# Patient Record
Sex: FEMALE | Race: BLACK OR AFRICAN AMERICAN | Employment: UNEMPLOYED | URBAN - METROPOLITAN AREA
[De-identification: names, ages, dates, MRNs, and addresses within clinical notes are randomized per-mention and may not be internally consistent; named-entity substitution may affect disease eponyms.]

---

## 2019-12-24 ENCOUNTER — HOSPITAL ENCOUNTER (OUTPATIENT)
Dept: CT IMAGING | Age: 34
Discharge: HOME | End: 2019-12-24
Attending: FAMILY MEDICINE
Payer: COMMERCIAL

## 2019-12-24 DIAGNOSIS — S92.254A CLOSED NONDISPLACED FRACTURE OF NAVICULAR BONE OF RIGHT FOOT: ICD-10-CM

## 2019-12-24 PROCEDURE — 73700 CT LOWER EXTREMITY W/O DYE: CPT

## 2020-03-07 ENCOUNTER — HOSPITAL ENCOUNTER (INPATIENT)
Age: 35
LOS: 4 days | Discharge: HOME | DRG: 043 | End: 2020-03-11
Attending: EMERGENCY MEDICINE | Admitting: INTERNAL MEDICINE
Payer: MEDICAID

## 2020-03-07 ENCOUNTER — APPOINTMENT (OUTPATIENT)
Dept: GENERAL RADIOLOGY | Age: 35
DRG: 043 | End: 2020-03-07
Attending: PHYSICIAN ASSISTANT
Payer: MEDICAID

## 2020-03-07 DIAGNOSIS — H46.9 OPTIC NEURITIS: ICD-10-CM

## 2020-03-07 DIAGNOSIS — R42 DIZZINESS: ICD-10-CM

## 2020-03-07 DIAGNOSIS — G35 MULTIPLE SCLEROSIS EXACERBATION (HCC): Primary | ICD-10-CM

## 2020-03-07 DIAGNOSIS — E86.0 DEHYDRATION: ICD-10-CM

## 2020-03-07 DIAGNOSIS — D64.9 ANEMIA, UNSPECIFIED TYPE: ICD-10-CM

## 2020-03-07 DIAGNOSIS — R55 SYNCOPE, UNSPECIFIED SYNCOPE TYPE: ICD-10-CM

## 2020-03-07 DIAGNOSIS — G43.019 INTRACTABLE MIGRAINE WITHOUT AURA AND WITHOUT STATUS MIGRAINOSUS: ICD-10-CM

## 2020-03-07 DIAGNOSIS — E03.8 OTHER SPECIFIED HYPOTHYROIDISM: ICD-10-CM

## 2020-03-07 DIAGNOSIS — E87.6 HYPOKALEMIA: ICD-10-CM
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
## 2020-03-07 PROBLEM — E06.3 HYPOTHYROIDISM DUE TO HASHIMOTO'S THYROIDITIS: Chronic | Status: ACTIVE | Noted: 2020-03-07

## 2020-03-07 PROBLEM — Z72.0 TOBACCO ABUSE DISORDER: Chronic | Status: ACTIVE | Noted: 2020-03-07

## 2020-03-07 PROBLEM — F10.90 CHRONIC ALCOHOL USE: Chronic | Status: ACTIVE | Noted: 2020-03-07
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
## 2020-03-07 LAB
ALBUMIN SERPL-MCNC: 3.6 G/DL (ref 3.5–5)
ALBUMIN/GLOB SERPL: 1.1 {RATIO} (ref 1.1–2.2)
ALP SERPL-CCNC: 82 U/L (ref 45–117)
ALT SERPL-CCNC: 37 U/L (ref 12–78)
AMPHET UR QL SCN: NEGATIVE
ANION GAP SERPL CALC-SCNC: 6 MMOL/L (ref 5–15)
APPEARANCE UR: ABNORMAL
AST SERPL-CCNC: 57 U/L (ref 15–37)
BACTERIA URNS QL MICRO: ABNORMAL /HPF
BARBITURATES UR QL SCN: NEGATIVE
BASOPHILS # BLD: 0 K/UL (ref 0–0.1)
BASOPHILS NFR BLD: 0 % (ref 0–1)
BENZODIAZ UR QL: NEGATIVE
BILIRUB SERPL-MCNC: 0.4 MG/DL (ref 0.2–1)
BILIRUB UR QL: NEGATIVE
BUN SERPL-MCNC: 11 MG/DL (ref 6–20)
BUN/CREAT SERPL: 9 (ref 12–20)
CALCIUM SERPL-MCNC: 8.5 MG/DL (ref 8.5–10.1)
CANNABINOIDS UR QL SCN: NEGATIVE
CHLORIDE SERPL-SCNC: 104 MMOL/L (ref 97–108)
CK SERPL-CCNC: 655 U/L (ref 26–192)
CO2 SERPL-SCNC: 30 MMOL/L (ref 21–32)
COCAINE UR QL SCN: POSITIVE
COLOR UR: ABNORMAL
CREAT SERPL-MCNC: 1.23 MG/DL (ref 0.55–1.02)
DIFFERENTIAL METHOD BLD: ABNORMAL
DRUG SCRN COMMENT,DRGCM: ABNORMAL
EOSINOPHIL # BLD: 0 K/UL (ref 0–0.4)
EOSINOPHIL NFR BLD: 1 % (ref 0–7)
EPITH CASTS URNS QL MICRO: ABNORMAL /LPF
ERYTHROCYTE [DISTWIDTH] IN BLOOD BY AUTOMATED COUNT: 16.5 % (ref 11.5–14.5)
ERYTHROCYTE [SEDIMENTATION RATE] IN BLOOD: 19 MM/HR (ref 0–20)
ERYTHROCYTE [SEDIMENTATION RATE] IN BLOOD: 21 MM/HR (ref 0–20)
ETHANOL SERPL-MCNC: <10 MG/DL
FLUAV AG NPH QL IA: NEGATIVE
FLUBV AG NOSE QL IA: NEGATIVE
GLOBULIN SER CALC-MCNC: 3.4 G/DL (ref 2–4)
GLUCOSE BLD STRIP.AUTO-MCNC: 143 MG/DL (ref 65–100)
GLUCOSE BLD STRIP.AUTO-MCNC: 205 MG/DL (ref 65–100)
GLUCOSE SERPL-MCNC: 91 MG/DL (ref 65–100)
GLUCOSE UR STRIP.AUTO-MCNC: NEGATIVE MG/DL
HCT VFR BLD AUTO: 28.9 % (ref 35–47)
HGB BLD-MCNC: 8.8 G/DL (ref 11.5–16)
HGB UR QL STRIP: NEGATIVE
IMM GRANULOCYTES # BLD AUTO: 0 K/UL (ref 0–0.04)
IMM GRANULOCYTES NFR BLD AUTO: 0 % (ref 0–0.5)
IRON SATN MFR SERPL: 3 % (ref 20–50)
IRON SERPL-MCNC: 16 UG/DL (ref 35–150)
KETONES UR QL STRIP.AUTO: NEGATIVE MG/DL
LEUKOCYTE ESTERASE UR QL STRIP.AUTO: ABNORMAL
LIPASE SERPL-CCNC: 237 U/L (ref 73–393)
LYMPHOCYTES # BLD: 1.6 K/UL (ref 0.8–3.5)
LYMPHOCYTES NFR BLD: 35 % (ref 12–49)
MCH RBC QN AUTO: 25.4 PG (ref 26–34)
MCHC RBC AUTO-ENTMCNC: 30.4 G/DL (ref 30–36.5)
MCV RBC AUTO: 83.5 FL (ref 80–99)
METHADONE UR QL: NEGATIVE
MONOCYTES # BLD: 0.4 K/UL (ref 0–1)
MONOCYTES NFR BLD: 9 % (ref 5–13)
NEUTS SEG # BLD: 2.4 K/UL (ref 1.8–8)
NEUTS SEG NFR BLD: 55 % (ref 32–75)
NITRITE UR QL STRIP.AUTO: POSITIVE
NRBC # BLD: 0 K/UL (ref 0–0.01)
NRBC BLD-RTO: 0 PER 100 WBC
OPIATES UR QL: NEGATIVE
PCP UR QL: NEGATIVE
PH UR STRIP: 6 [PH] (ref 5–8)
PLATELET # BLD AUTO: 249 K/UL (ref 150–400)
PMV BLD AUTO: 9.7 FL (ref 8.9–12.9)
POTASSIUM SERPL-SCNC: 3.3 MMOL/L (ref 3.5–5.1)
PROT SERPL-MCNC: 7 G/DL (ref 6.4–8.2)
PROT UR STRIP-MCNC: ABNORMAL MG/DL
RBC # BLD AUTO: 3.46 M/UL (ref 3.8–5.2)
RBC #/AREA URNS HPF: ABNORMAL /HPF (ref 0–5)
SERVICE CMNT-IMP: ABNORMAL
SERVICE CMNT-IMP: ABNORMAL
SODIUM SERPL-SCNC: 140 MMOL/L (ref 136–145)
SP GR UR REFRACTOMETRY: 1.02 (ref 1–1.03)
T3FREE SERPL-MCNC: <0.5 PG/ML (ref 2.2–4)
T4 FREE SERPL-MCNC: 0.2 NG/DL (ref 0.8–1.5)
TIBC SERPL-MCNC: 468 UG/DL (ref 250–450)
TROPONIN I SERPL-MCNC: <0.05 NG/ML
TSH SERPL DL<=0.05 MIU/L-ACNC: 43.78 UIU/ML (ref 0.36–3.74)
UA: UC IF INDICATED,UAUC: ABNORMAL
UROBILINOGEN UR QL STRIP.AUTO: 1 EU/DL (ref 0.2–1)
WBC # BLD AUTO: 4.5 K/UL (ref 3.6–11)
WBC URNS QL MICRO: ABNORMAL /HPF (ref 0–4)

## 2020-03-07 PROCEDURE — 85652 RBC SED RATE AUTOMATED: CPT

## 2020-03-07 PROCEDURE — 36415 COLL VENOUS BLD VENIPUNCTURE: CPT

## 2020-03-07 PROCEDURE — 74011250636 HC RX REV CODE- 250/636: Performed by: INTERNAL MEDICINE

## 2020-03-07 PROCEDURE — 82962 GLUCOSE BLOOD TEST: CPT

## 2020-03-07 PROCEDURE — 65270000032 HC RM SEMIPRIVATE

## 2020-03-07 PROCEDURE — 82550 ASSAY OF CK (CPK): CPT

## 2020-03-07 PROCEDURE — 99284 EMERGENCY DEPT VISIT MOD MDM: CPT

## 2020-03-07 PROCEDURE — 93005 ELECTROCARDIOGRAM TRACING: CPT

## 2020-03-07 PROCEDURE — 99283 EMERGENCY DEPT VISIT LOW MDM: CPT

## 2020-03-07 PROCEDURE — 87086 URINE CULTURE/COLONY COUNT: CPT

## 2020-03-07 PROCEDURE — 80307 DRUG TEST PRSMV CHEM ANLYZR: CPT

## 2020-03-07 PROCEDURE — 72100 X-RAY EXAM L-S SPINE 2/3 VWS: CPT

## 2020-03-07 PROCEDURE — 74011636637 HC RX REV CODE- 636/637: Performed by: INTERNAL MEDICINE

## 2020-03-07 PROCEDURE — 84484 ASSAY OF TROPONIN QUANT: CPT

## 2020-03-07 PROCEDURE — 80053 COMPREHEN METABOLIC PANEL: CPT

## 2020-03-07 PROCEDURE — 94760 N-INVAS EAR/PLS OXIMETRY 1: CPT

## 2020-03-07 PROCEDURE — 87186 SC STD MICRODIL/AGAR DIL: CPT

## 2020-03-07 PROCEDURE — 96375 TX/PRO/DX INJ NEW DRUG ADDON: CPT

## 2020-03-07 PROCEDURE — 84443 ASSAY THYROID STIM HORMONE: CPT

## 2020-03-07 PROCEDURE — 96374 THER/PROPH/DIAG INJ IV PUSH: CPT

## 2020-03-07 PROCEDURE — 85025 COMPLETE CBC W/AUTO DIFF WBC: CPT

## 2020-03-07 PROCEDURE — 87804 INFLUENZA ASSAY W/OPTIC: CPT

## 2020-03-07 PROCEDURE — 74011000250 HC RX REV CODE- 250: Performed by: INTERNAL MEDICINE

## 2020-03-07 PROCEDURE — 81001 URINALYSIS AUTO W/SCOPE: CPT

## 2020-03-07 PROCEDURE — 83690 ASSAY OF LIPASE: CPT

## 2020-03-07 PROCEDURE — 74011250636 HC RX REV CODE- 250/636: Performed by: PHYSICIAN ASSISTANT

## 2020-03-07 PROCEDURE — 70450 CT HEAD/BRAIN W/O DYE: CPT

## 2020-03-07 PROCEDURE — 83540 ASSAY OF IRON: CPT

## 2020-03-07 PROCEDURE — 87077 CULTURE AEROBIC IDENTIFY: CPT

## 2020-03-07 PROCEDURE — 84481 FREE ASSAY (FT-3): CPT

## 2020-03-07 PROCEDURE — 84439 ASSAY OF FREE THYROXINE: CPT

## 2020-03-07 PROCEDURE — 74011250637 HC RX REV CODE- 250/637: Performed by: PHYSICIAN ASSISTANT

## 2020-03-07 PROCEDURE — 74011000258 HC RX REV CODE- 258: Performed by: PHYSICIAN ASSISTANT

## 2020-03-07 PROCEDURE — 74011250637 HC RX REV CODE- 250/637: Performed by: INTERNAL MEDICINE

## 2020-03-07 RX ORDER — LEVOTHYROXINE SODIUM 50 UG/1
50 TABLET ORAL
Status: DISCONTINUED | OUTPATIENT
Start: 2020-03-07 | End: 2020-03-11 | Stop reason: HOSPADM

## 2020-03-07 RX ORDER — LORAZEPAM 1 MG/1
2 TABLET ORAL
Status: DISCONTINUED | OUTPATIENT
Start: 2020-03-07 | End: 2020-03-11 | Stop reason: HOSPADM

## 2020-03-07 RX ORDER — ACETAMINOPHEN 500 MG
500 TABLET ORAL
Status: DISCONTINUED | OUTPATIENT
Start: 2020-03-07 | End: 2020-03-11 | Stop reason: HOSPADM

## 2020-03-07 RX ORDER — ONDANSETRON 2 MG/ML
2 INJECTION INTRAMUSCULAR; INTRAVENOUS
Status: DISCONTINUED | OUTPATIENT
Start: 2020-03-07 | End: 2020-03-11 | Stop reason: HOSPADM

## 2020-03-07 RX ORDER — POTASSIUM CHLORIDE 750 MG/1
10 TABLET, FILM COATED, EXTENDED RELEASE ORAL
Status: COMPLETED | OUTPATIENT
Start: 2020-03-07 | End: 2020-03-07

## 2020-03-07 RX ORDER — BISACODYL 5 MG
5 TABLET, DELAYED RELEASE (ENTERIC COATED) ORAL DAILY PRN
Status: DISCONTINUED | OUTPATIENT
Start: 2020-03-07 | End: 2020-03-11 | Stop reason: HOSPADM

## 2020-03-07 RX ORDER — SODIUM CHLORIDE 0.9 % (FLUSH) 0.9 %
5-40 SYRINGE (ML) INJECTION AS NEEDED
Status: DISCONTINUED | OUTPATIENT
Start: 2020-03-07 | End: 2020-03-11 | Stop reason: HOSPADM

## 2020-03-07 RX ORDER — INSULIN LISPRO 100 [IU]/ML
INJECTION, SOLUTION INTRAVENOUS; SUBCUTANEOUS
Status: DISCONTINUED | OUTPATIENT
Start: 2020-03-07 | End: 2020-03-11 | Stop reason: HOSPADM

## 2020-03-07 RX ORDER — HEPARIN SODIUM 5000 [USP'U]/ML
5000 INJECTION, SOLUTION INTRAVENOUS; SUBCUTANEOUS EVERY 8 HOURS
Status: DISCONTINUED | OUTPATIENT
Start: 2020-03-07 | End: 2020-03-11 | Stop reason: HOSPADM

## 2020-03-07 RX ORDER — LEVOTHYROXINE SODIUM 50 UG/1
50 TABLET ORAL
COMMUNITY
End: 2020-03-11

## 2020-03-07 RX ORDER — NICOTINE 7MG/24HR
1 PATCH, TRANSDERMAL 24 HOURS TRANSDERMAL
Status: DISCONTINUED | OUTPATIENT
Start: 2020-03-07 | End: 2020-03-11 | Stop reason: HOSPADM

## 2020-03-07 RX ORDER — DEXTROSE MONOHYDRATE 100 MG/ML
0-250 INJECTION, SOLUTION INTRAVENOUS AS NEEDED
Status: DISCONTINUED | OUTPATIENT
Start: 2020-03-07 | End: 2020-03-11 | Stop reason: HOSPADM

## 2020-03-07 RX ORDER — LORAZEPAM 1 MG/1
4 TABLET ORAL
Status: DISCONTINUED | OUTPATIENT
Start: 2020-03-07 | End: 2020-03-11 | Stop reason: HOSPADM

## 2020-03-07 RX ORDER — MAGNESIUM SULFATE 100 %
4 CRYSTALS MISCELLANEOUS AS NEEDED
Status: DISCONTINUED | OUTPATIENT
Start: 2020-03-07 | End: 2020-03-11 | Stop reason: HOSPADM

## 2020-03-07 RX ORDER — IBUPROFEN 400 MG/1
800 TABLET ORAL
Status: ON HOLD | COMMUNITY
Start: 2019-11-26 | End: 2020-03-08 | Stop reason: ALTCHOICE

## 2020-03-07 RX ORDER — HYDROCODONE BITARTRATE AND ACETAMINOPHEN 5; 325 MG/1; MG/1
1 TABLET ORAL
Status: DISCONTINUED | OUTPATIENT
Start: 2020-03-07 | End: 2020-03-07

## 2020-03-07 RX ORDER — TRAMADOL HYDROCHLORIDE 50 MG/1
50 TABLET ORAL
Status: ON HOLD | COMMUNITY
Start: 2019-12-18 | End: 2020-03-08 | Stop reason: ALTCHOICE

## 2020-03-07 RX ORDER — HYDROCODONE BITARTRATE AND ACETAMINOPHEN 5; 325 MG/1; MG/1
1 TABLET ORAL
Status: DISCONTINUED | OUTPATIENT
Start: 2020-03-07 | End: 2020-03-11 | Stop reason: HOSPADM

## 2020-03-07 RX ORDER — ONDANSETRON 2 MG/ML
4 INJECTION INTRAMUSCULAR; INTRAVENOUS
Status: COMPLETED | OUTPATIENT
Start: 2020-03-07 | End: 2020-03-07

## 2020-03-07 RX ORDER — LANOLIN ALCOHOL/MO/W.PET/CERES
100 CREAM (GRAM) TOPICAL DAILY
Status: DISCONTINUED | OUTPATIENT
Start: 2020-03-08 | End: 2020-03-11 | Stop reason: HOSPADM

## 2020-03-07 RX ORDER — CYCLOBENZAPRINE HCL 5 MG
5 TABLET ORAL
Status: ON HOLD | COMMUNITY
End: 2020-03-08 | Stop reason: ALTCHOICE

## 2020-03-07 RX ORDER — FOLIC ACID 1 MG/1
1 TABLET ORAL DAILY
Status: DISCONTINUED | OUTPATIENT
Start: 2020-03-08 | End: 2020-03-11 | Stop reason: HOSPADM

## 2020-03-07 RX ORDER — LORAZEPAM 2 MG/ML
1 INJECTION INTRAMUSCULAR
Status: DISCONTINUED | OUTPATIENT
Start: 2020-03-07 | End: 2020-03-11 | Stop reason: HOSPADM

## 2020-03-07 RX ORDER — LORAZEPAM 2 MG/ML
2 INJECTION INTRAMUSCULAR
Status: DISCONTINUED | OUTPATIENT
Start: 2020-03-07 | End: 2020-03-11 | Stop reason: HOSPADM

## 2020-03-07 RX ORDER — NALOXONE HYDROCHLORIDE 0.4 MG/ML
0.4 INJECTION, SOLUTION INTRAMUSCULAR; INTRAVENOUS; SUBCUTANEOUS AS NEEDED
Status: DISCONTINUED | OUTPATIENT
Start: 2020-03-07 | End: 2020-03-11 | Stop reason: HOSPADM

## 2020-03-07 RX ORDER — MAGNESIUM SULFATE 1 G/100ML
1 INJECTION INTRAVENOUS ONCE
Status: COMPLETED | OUTPATIENT
Start: 2020-03-07 | End: 2020-03-08

## 2020-03-07 RX ORDER — SODIUM CHLORIDE 0.9 % (FLUSH) 0.9 %
5-40 SYRINGE (ML) INJECTION EVERY 8 HOURS
Status: DISCONTINUED | OUTPATIENT
Start: 2020-03-07 | End: 2020-03-11 | Stop reason: HOSPADM

## 2020-03-07 RX ADMIN — THIAMINE HYDROCHLORIDE: 100 INJECTION, SOLUTION INTRAMUSCULAR; INTRAVENOUS at 16:42

## 2020-03-07 RX ADMIN — LEVOTHYROXINE SODIUM 50 MCG: 50 TABLET ORAL at 19:06

## 2020-03-07 RX ADMIN — SODIUM CHLORIDE 1000 MG: 9 INJECTION, SOLUTION INTRAVENOUS at 15:28

## 2020-03-07 RX ADMIN — SODIUM CHLORIDE 1000 ML: 900 INJECTION, SOLUTION INTRAVENOUS at 13:54

## 2020-03-07 RX ADMIN — POTASSIUM CHLORIDE 10 MEQ: 750 TABLET, EXTENDED RELEASE ORAL at 15:28

## 2020-03-07 RX ADMIN — Medication 10 ML: at 21:06

## 2020-03-07 RX ADMIN — ACETAMINOPHEN 500 MG: 500 TABLET ORAL at 16:42

## 2020-03-07 RX ADMIN — ONDANSETRON 4 MG: 2 SOLUTION INTRAMUSCULAR; INTRAVENOUS at 13:54

## 2020-03-07 RX ADMIN — INSULIN LISPRO 2 UNITS: 100 INJECTION, SOLUTION INTRAVENOUS; SUBCUTANEOUS at 19:16

## 2020-03-07 RX ADMIN — HYDROCODONE BITARTRATE AND ACETAMINOPHEN 1 TABLET: 5; 325 TABLET ORAL at 19:05

## 2020-03-07 RX ADMIN — HEPARIN SODIUM 5000 UNITS: 5000 INJECTION INTRAVENOUS; SUBCUTANEOUS at 19:16

## 2020-03-07 NOTE — ED NOTES
TRANSFER - OUT REPORT:    Verbal report given to Pranay Taylor RN (name) on [place]  being transferred to [facility](unit) for routine progression of care       Report consisted of patients Situation, Background, Assessment and   Recommendations(SBAR). Information from the following report(s) SBAR, ED Summary and Recent Results was reviewed with the receiving nurse. Lines:   Peripheral IV 03/07/20 Left Antecubital (Active)   Site Assessment Clean, dry, & intact 3/7/2020  1:46 PM   Phlebitis Assessment 0 3/7/2020  1:46 PM   Infiltration Assessment 0 3/7/2020  1:46 PM   Dressing Status Clean, dry, & intact 3/7/2020  1:46 PM   Dressing Type Transparent 3/7/2020  1:46 PM   Hub Color/Line Status Pink;Patent; Flushed 3/7/2020  1:46 PM   Action Taken Blood drawn 3/7/2020  1:46 PM        Opportunity for questions and clarification was provided.       Patient transported with:   [employer]

## 2020-03-07 NOTE — ED NOTES
Pt presents ambulatory to ED complaining of dizziness and generalized pain and weakness. Pt reports she became dizzy last night and fell down a flight of stairs. Pt reports positive LOC, reports back pain subsequent to fall. Pt is alert and oriented x 4, RR even and unlabored, skin is warm and dry. Assesment completed and pt updated on plan of care. Emergency Department Nursing Plan of Care       The Nursing Plan of Care is developed from the Nursing assessment and Emergency Department Attending provider initial evaluation. The plan of care may be reviewed in the ED Provider note.     The Plan of Care was developed with the following considerations:   Patient / Family readiness to learn indicated by:verbalized understanding  Persons(s) to be included in education: patient  Barriers to Learning/Limitations:No    Signed     Adrian Cee RN    3/7/2020   3:19 PM

## 2020-03-07 NOTE — ED PROVIDER NOTES
EMERGENCY DEPARTMENT HISTORY AND PHYSICAL EXAM      Date: 3/7/2020  Patient Name: [place]    History of Presenting Illness     Chief Complaint   Patient presents with    Fall     History Provided By: Patient    HPI: [place], 29 y.o. female with history of MS, Graves' disease w/ iodine tx and subsequent hypothyroidism, iron deficiency anemia who presents ambulatory to the ED with cc of subacute moderate worsening generalized body aches, dizziness, lightheadedness, blurred vision, headaches, generalized weakness X 2 months. Patient endorses she was seen and evaluated at [facility] ED 1 month prior to arrival diagnosed with UTI. Patient states that she was followed by Dr. Valerio Han in [place], neurology, for MS, but has not been seen by those providers in over a year. States he has not been taking any medications for MS in over a year. States that she has been out of her levothyroxine for 1 month. No medications or alleviating factors for current symptoms. Patient states that she was walking downstairs last night when she all of a sudden blacked out and fell down the stairs. States that she might of hit the right side of her head. Unknown amount of time that she was unconscious. States that she was able to get up and walk back up the stairs afterwards and call her family member. Patient did not seek medical treatment for this last night. The patient endorses worsening right lower extremity weakness and low back pain. Patient endorses that she has a history of right lower extremity weakness due to her MS as well as history of transient paralysis due to MS. Patient versus she has been having decreased appetite and significant weight loss over the last couple of months (typically 170 pounds; today she is 146 pounds).   Denies chest pain, shortness of breath, cough, wheezing, eye pain, double vision, photophobia, chest pain, palpitations, leg swelling or leg pain, incontinence, saddle paresthesias, urinary symptoms, urinary retention, constipation, diarrhea, abdominal pain, confusion, body swelling. PCP: Unknown, Provider    There are no other complaints, changes, or physical findings at this time. No current facility-administered medications on file prior to encounter. Current Outpatient Medications on File Prior to Encounter   Medication Sig Dispense Refill    ibuprofen (MOTRIN) 400 mg tablet       traMADoL (ULTRAM) 50 mg tablet Take 50 mg by mouth. Past History     Past Medical History:  Past Medical History:   Diagnosis Date    Anemia     Graves disease     Multiple sclerosis ([facility] [address]      Past Surgical History:  Past Surgical History:   Procedure Laterality Date    HX  SECTION      x4 occurences    HX FREE SKIN GRAFT       Family History:  History reviewed. No pertinent family history. Social History:  Social History     Tobacco Use    Smoking status: Current Some Day Smoker     Packs/day: 1.00     Years: 15.00     Pack years: 15.00    Smokeless tobacco: Never Used   Substance Use Topics    Alcohol use: Yes     Comment: weekens only    Drug use: Not Currently     Allergies: Allergies   Allergen Reactions    Onion Anaphylaxis    Raspberry Anaphylaxis    Caffeine Other (comments)     Reports \"bad shaking\"     Review of Systems   Review of Systems   Constitutional: Positive for activity change, appetite change, fatigue and unexpected weight change. Negative for chills, diaphoresis and fever. HENT: Negative for congestion, dental problem, ear pain, facial swelling, mouth sores, rhinorrhea, sinus pressure, sore throat, trouble swallowing and voice change. Eyes: Positive for visual disturbance. Negative for photophobia and pain. Respiratory: Negative for apnea, cough, chest tightness, shortness of breath and wheezing. Cardiovascular: Negative for chest pain, palpitations and leg swelling.    Gastrointestinal: Negative for abdominal pain, constipation, diarrhea, nausea and vomiting. Genitourinary: Negative. Negative for decreased urine volume, difficulty urinating, frequency, hematuria, pelvic pain, vaginal bleeding and vaginal discharge. Musculoskeletal: Positive for back pain and myalgias. Negative for gait problem, neck pain and neck stiffness. Skin: Negative. Negative for pallor and wound. Neurological: Positive for dizziness, syncope, weakness, light-headedness and headaches. Negative for seizures, facial asymmetry, speech difficulty and numbness. Psychiatric/Behavioral: Negative. Negative for confusion. Physical Exam   Physical Exam  Vitals signs and nursing note reviewed. Constitutional:       General: She is not in acute distress. Appearance: Normal appearance. She is well-developed. She is not ill-appearing, toxic-appearing or diaphoretic. HENT:      Head: Normocephalic and atraumatic. No raccoon eyes, Fernandez's sign, abrasion, contusion, masses or laceration. Jaw: There is normal jaw occlusion. No trismus. Right Ear: Hearing and external ear normal.      Left Ear: Hearing and external ear normal.      Nose: Nose normal.      Mouth/Throat:      Mouth: Mucous membranes are dry. Pharynx: No oropharyngeal exudate or posterior oropharyngeal erythema. Eyes:      General: No visual field deficit. Conjunctiva/sclera: Conjunctivae normal.      Pupils: Pupils are equal, round, and reactive to light. Neck:      Musculoskeletal: Normal range of motion. Thyroid: No thyroid mass, thyromegaly or thyroid tenderness. Trachea: Trachea and phonation normal.   Cardiovascular:      Rate and Rhythm: Normal rate and regular rhythm. Pulses: Normal pulses. Heart sounds: Normal heart sounds. Pulmonary:      Effort: Pulmonary effort is normal. No respiratory distress. Breath sounds: Normal breath sounds and air entry. No wheezing. Abdominal:      Palpations: Abdomen is soft. Tenderness: There is no abdominal tenderness. There is no guarding. Musculoskeletal: Normal range of motion. Cervical back: Normal.      Thoracic back: Normal.      Lumbar back: She exhibits pain. She exhibits normal range of motion, no tenderness, no bony tenderness, no swelling, no edema, no deformity, no laceration, no spasm and normal pulse. Comments: No crepitus, deformity, step-off, edema, instability, tenderness to palpation. Neurovascular intact. Lymphadenopathy:      Cervical: No cervical adenopathy. Skin:     General: Skin is warm and dry. Coloration: Skin is not pale. Findings: No bruising, ecchymosis, erythema, signs of injury, laceration or wound. Neurological:      Mental Status: She is alert and oriented to person, place, and time. GCS: GCS eye subscore is 4. GCS verbal subscore is 5. GCS motor subscore is 6. Cranial Nerves: Cranial nerves are intact. No cranial nerve deficit, dysarthria or facial asymmetry. Sensory: Sensation is intact. Motor: No seizure activity or pronator drift. Coordination: Coordination is intact. Gait: Gait is intact. Comments: 5/5 BUE. 4/5 RLE. 5/5 LLE. (Pt endorses chronic)   Psychiatric:         Behavior: Behavior normal.         Thought Content:  Thought content normal.         Judgment: Judgment normal.       Diagnostic Study Results   Labs -     Recent Results (from the past 12 hour(s))   CBC WITH AUTOMATED DIFF    Collection Time: 03/07/20  1:13 PM   Result Value Ref Range    WBC 4.5 3.6 - 11.0 K/uL    RBC 3.46 (L) 3.80 - 5.20 M/uL    HGB 8.8 (L) 11.5 - 16.0 g/dL    HCT 28.9 (L) 35.0 - 47.0 %    MCV 83.5 80.0 - 99.0 FL    MCH 25.4 (L) 26.0 - 34.0 PG    MCHC 30.4 30.0 - 36.5 g/dL    RDW 16.5 (H) 11.5 - 14.5 %    PLATELET 929 366 - 116 K/uL    MPV 9.7 8.9 - 12.9 FL    NRBC 0.0 0  WBC    ABSOLUTE NRBC 0.00 0.00 - 0.01 K/uL    NEUTROPHILS 55 32 - 75 %    LYMPHOCYTES 35 12 - 49 %    MONOCYTES 9 5 - 13 %    EOSINOPHILS 1 0 - 7 %    BASOPHILS 0 0 - 1 %    IMMATURE GRANULOCYTES 0 0.0 - 0.5 %    ABS. NEUTROPHILS 2.4 1.8 - 8.0 K/UL    ABS. LYMPHOCYTES 1.6 0.8 - 3.5 K/UL    ABS. MONOCYTES 0.4 0.0 - 1.0 K/UL    ABS. EOSINOPHILS 0.0 0.0 - 0.4 K/UL    ABS. BASOPHILS 0.0 0.0 - 0.1 K/UL    ABS. IMM. GRANS. 0.0 0.00 - 0.04 K/UL    DF AUTOMATED     METABOLIC PANEL, COMPREHENSIVE    Collection Time: 03/07/20  1:13 PM   Result Value Ref Range    Sodium 140 136 - 145 mmol/L    Potassium 3.3 (L) 3.5 - 5.1 mmol/L    Chloride 104 97 - 108 mmol/L    CO2 30 21 - 32 mmol/L    Anion gap 6 5 - 15 mmol/L    Glucose 91 65 - 100 mg/dL    BUN 11 6 - 20 MG/DL    Creatinine 1.23 (H) 0.55 - 1.02 MG/DL    BUN/Creatinine ratio 9 (L) 12 - 20      GFR est AA >60 >60 ml/min/1.73m2    GFR est non-AA 50 (L) >60 ml/min/1.73m2    Calcium 8.5 8.5 - 10.1 MG/DL    Bilirubin, total 0.4 0.2 - 1.0 MG/DL    ALT (SGPT) 37 12 - 78 U/L    AST (SGOT) 57 (H) 15 - 37 U/L    Alk.  phosphatase 82 45 - 117 U/L    Protein, total 7.0 6.4 - 8.2 g/dL    Albumin 3.6 3.5 - 5.0 g/dL    Globulin 3.4 2.0 - 4.0 g/dL    A-G Ratio 1.1 1.1 - 2.2     SED RATE (ESR)    Collection Time: 03/07/20  1:13 PM   Result Value Ref Range    Sed rate, automated 21 (H) 0 - 20 mm/hr   CK    Collection Time: 03/07/20  1:13 PM   Result Value Ref Range     (H) 26 - 192 U/L   INFLUENZA A+B VIRAL AGS    Collection Time: 03/07/20  1:13 PM   Result Value Ref Range    Influenza A Antigen NEGATIVE  NEG      Influenza B Antigen NEGATIVE  NEG     TROPONIN I    Collection Time: 03/07/20  1:13 PM   Result Value Ref Range    Troponin-I, Qt. <0.05 <0.05 ng/mL   ETHYL ALCOHOL    Collection Time: 03/07/20  1:13 PM   Result Value Ref Range    ALCOHOL(ETHYL),SERUM <10 <10 MG/DL   LIPASE    Collection Time: 03/07/20  1:13 PM   Result Value Ref Range    Lipase 237 73 - 393 U/L   TSH 3RD GENERATION    Collection Time: 03/07/20  1:13 PM   Result Value Ref Range    TSH 43.78 (H) 0.36 - 3.74 uIU/mL   EKG, 12 LEAD, INITIAL    Collection Time: 03/07/20  1:22 PM   Result Value Ref Range    Ventricular Rate 51 BPM    Atrial Rate 51 BPM    P-R Interval 226 ms    QRS Duration 84 ms    Q-T Interval 418 ms    QTC Calculation (Bezet) 385 ms    Calculated P Axis 64 degrees    Calculated R Axis 6 degrees    Calculated T Axis 39 degrees    Diagnosis       Sinus bradycardia with 1st degree AV block  Low voltage QRS  Borderline ECG  No previous ECGs available         Radiologic Studies -   CT HEAD WO CONT   Final Result   IMPRESSION:    1. No definite acute intracranial abnormality by this modality. 2. Areas of subcortical white matter hypoattenuation which is consistent with   the clinical history of multiple sclerosis. XR SPINE LUMB 2 OR 3 V   Final Result   IMPRESSION:     1. Normal lumbar spine. Xr Spine Lumb 2 Or 3 V    Result Date: 3/7/2020  IMPRESSION:  1. Normal lumbar spine. Ct Head Wo Cont    Result Date: 3/7/2020  IMPRESSION: 1. No definite acute intracranial abnormality by this modality. 2. Areas of subcortical white matter hypoattenuation which is consistent with the clinical history of multiple sclerosis. Medical Decision Making   I am the first provider for this patient. I reviewed the vital signs, available nursing notes, past medical history, past surgical history, family history and social history. Vital Signs-Reviewed the patient's vital signs. Patient Vitals for the past 12 hrs:   Temp Pulse Resp BP SpO2   03/07/20 1428  (!) 59  111/74    03/07/20 1425  (!) 55  118/80    03/07/20 1420  (!) 53  115/86    03/07/20 1338    125/78    03/07/20 1237 98.1 °F (36.7 °C) 61 18  97 %     Pulse Oximetry Analysis - 97% on RA    EKG interpretation: (Preliminary)  Rhythm: sinus bradycardia w/ 1st degree AV block; and regular . Rate (approx.): 51; Axis: normal; VT interval: prolonged; QRS interval: normal ; ST/T wave: normal; Other findings: borderline ekg.     Records Reviewed: Nursing Notes, Old Medical Records, Previous electrocardiograms, Previous Radiology Studies and Previous Laboratory Studies    Provider Notes (Medical Decision Making):   Pt presents with lightheadedness, dizziness, anorexia. DDx: dehydration, anemia, electrolyte abnormality, infection, orthostatic hypotension, medication side effect, MS. Will get orthostatics, labs and EKG PRN. ED Course:   Initial assessment performed. The patients presenting problems have been discussed, and they are in agreement with the care plan formulated and outlined with them. I have encouraged them to ask questions as they arise throughout their visit. ED Course as of Mar 07 1532   Sat Mar 07, 2020   1434   2:34 PM    I spoke with Dr. Chema Mendiola for Neurology. Discussed available diagnostic tests and clinical findings. He is in agreement with care plans as outlined. He recommends ig IV solumedrol followed by 250mg solumedrol q6. Recommends admission and he will see pt in the morning. Mendoza Gonzalez PA-C    []    Pt resting comfortably in room in NAD. No new symptoms or complaints at this time. Available labs/ results reviewed with pt.    [SM]      ED Course User Index  [SM] Katelin Acosta PA-C       Progress Note:   Updated pt on all returned results and findings. Discussed the importance of proper follow up as referred below along with return precautions. Pt in agreement with the care plan and expresses agreement with and understanding of all items discussed. Disposition:  2:56 PM  Patient is being admitted to the hospital by Dr. Allie Syed. The results of their tests and reasons for their admission have been discussed with them and/or available family. They convey agreement and understanding for the need to be admitted and for their admission diagnosis. Consultation has been made with the inpatient physician specialist for hospitalization. PLAN:  1. Current Discharge Medication List        2. Follow-up Information    None       Return to ED if worse     Diagnosis     Clinical Impression:   1. Multiple sclerosis exacerbation ([address]    2. Anemia, unspecified type    3. Hypokalemia    4. Dehydration    5. Other specified hypothyroidism            Please note that this dictation was completed with Dragon, computer voice recognition software. Quite often unanticipated grammatical, syntax, homophones, and other interpretive errors are inadvertently transcribed by the computer software. Please disregard these errors. Additionally, please excuse any errors that have escaped final proofreading.

## 2020-03-07 NOTE — ED TRIAGE NOTES
Patient presents to ED with c/o dizziness and lower back pain due to fall last night. Hx of Multiple sclerosis.

## 2020-03-07 NOTE — H&P
History and Physical          Pt Name  Mindy Chaves   Date of Birth 1985   Medical Record Number  [id number]      Age  29 y.o. PCP Unknown, Provider   Admit date:  3/7/2020    Room Number  ER03/03  @ [facility]   Date of Service  3/7/2020     Admission Diagnoses:  Multiple sclerosis exacerbation [facility])      Certification: We are admitting Mindy Chaves 29 y.o. female with a principle diagnosis of Multiple sclerosis exacerbation ([address]  This patient also suffers from other comorbidities listed below. I have a high level of concern for progression of  leading to life threatening complications      Assessment and plan:  Present on Admission:   [facility] (multiple sclerosis) [facility]   Multiple sclerosis exacerbation ([facility]   Hypothyroidism due to Hashimoto's thyroiditis   Fall at home   Anemia   Tobacco abuse disorder   Chronic alcohol use      Syncope possibly due to   MS exacerbation -no follow up with neurologist for about 2 years   · Admit to INPT status   · IV Solu-medrol 1gram daily x 3 doses. · SSI for expected hyperglycemia   · PT OT evaluation   · Neurologist consultation   · Fall precautions   · MRI CNS for evaluation of CNS status   · Will need OP follow up. Hypothyroidism -pt has not had thyroid replacement for >2years; she reports that she was taking about 100mcg of syndthroid daily previously   · Resume synthroid at 50mcg/QAM     Chronic use of ETOH -  · CIWA protocol   · M/V IV   · Thiamine and folate PO       Anemia -possibly from menorrhagia and chronic diseases   · Supportive care. Tobacco abuse disorder -<1PPD for \"many years \"  · Nicotine patch 14mg/day PRN       There is no height or weight on file to calculate BMI. -    ·        CODE STATUS  Full         Functional Status  Pt is disabled from her MS   She used to live in [facility], but she moved here with her 10,4,3 yo children.    She now resides with her sister   She acknowledges smoking less than 1ppd for many years   She drinks about half a case of beer per day and occasionally drinks liquor    Surrogate decision maker: Pt's sister Ms. Doni Bowen   [phone]   Prophylaxis  Hep SQ   Discharge Plan: Home w/Family,     There are currently no Active Isolations Payor: [facility] MEDICAID / Plan: [address] / Product Type: Managed Care Medicaid /    Social issues  Date Comment    20 3:37 PM No family or visitor here with the patient today         Prognosis  Fair      Subjective Data         History of Present Illness : The pt reports that she has had diagnosis of MS for long time and she had been followed by [facility] neurologists. Due to family reasons she came to [facility] for over two years. sonce then she has not had any follow up with neurologist or PCP   She has not taken any MS or thyroid medications.       Review of Systems - History obtained from the patient  General ROS: negative for - chills, fatigue or fever  Psychological ROS: negative  Ophthalmic ROS: negative  Respiratory ROS: no cough, shortness of breath, or wheezing  Cardiovascular ROS: no chest pain or dyspnea on exertion  Gastrointestinal ROS: no abdominal pain, change in bowel habits, or black or bloody stools  Genito-Urinary ROS: no dysuria, trouble voiding, or hematuria  Musculoskeletal ROS: positive for - gait disturbance and muscular weakness  negative for - joint pain, joint stiffness or joint swelling  Neurological ROS: positive for - dizziness and weakness  Dermatological ROS: negative  ROS       Past Medical History:   Diagnosis Date    Anemia     Graves disease     Multiple sclerosis ([address]          Past Surgical History:   Procedure Laterality Date    HX  SECTION      x4 occurences    HX FREE SKIN GRAFT           Social History     Tobacco Use    Smoking status: Current Some Day Smoker     Packs/day: 1.00     Years: 15.00     Pack years: 15.00    Smokeless tobacco: Never Used Substance Use Topics    Alcohol use: Yes     Comment: weekens only       History reviewed. No pertinent family history. No one in the family is known to suffer from [address]        Objective data     Comment:  pleasant cooperaive lady lying in ER bed in no distress      Patient Vitals for the past 24 hrs:   Temp   03/07/20 1237 98.1 °F (36.7 °C)      Patient Vitals for the past 24 hrs:   Pulse   03/07/20 1428 (!) 59   03/07/20 1425 (!) 55   03/07/20 1420 (!) 53   03/07/20 1237 61      Patient Vitals for the past 24 hrs:   Resp   03/07/20 1237 18      Patient Vitals for the past 24 hrs:   BP   03/07/20 1428 111/74   03/07/20 1425 118/80   03/07/20 1420 115/86   03/07/20 1338 125/78        SpO2 Readings from Last 6 Encounters:   03/07/20 97%         O2 Device: Room air   There is no height or weight on file to calculate BMI. - Wt Readings from Last 10 Encounters:   03/07/20 66.2 kg (146 lb)          Physical Exam:             General:  Alert, cooperative,   well noursished,   well developed,   appears stated age    Ears/Eyes:  Hearing intact  Sclera anicteric. Pupils equal   Mouth/Throat:  Mucous membranes normal pink and moist  Oral pharynx clear    Neck:     Lungs:  Trachea midline  Chest excursion symmetrical   Auscultation B/L Symmetrical with   Vesicular breath sounds     No Crepitations noted   Percussion note resonant on mid Clavicular line; no sign of pneumothorax        CVS:  Regular rate and rhythm   no  murmur,   Bradycardia note   No click, rub or gallop  S1 normal   S2 normal   Pedal pulses  b/l symmetrical   Abdomen:  Obese  Soft, non-tender  Bowel sounds normal  No distension   Percussion note tympanitic   Extremities:    No cyanosis, jaundice  No edema noted   No sign of DVT/cord like lesion on palpation  No sign of acute trauma   Age appropriate OA changes noted.     Skin:    Skin color, texture, turgor normal. no acute rash or lesions    Lymph nodes:     Musculoskeletal Muscle bulk B/L symmetrical Neuro Cranial nerves are intact,   motor movement b/l symmetrical   Sensory evaluation decreased sensation on the left LE with decreased touch sensation    Psych:  Alert and oriented, normal mood & affect given the clinical scenario          Medications reviewed     Current Facility-Administered Medications   Medication Dose Route Frequency    potassium chloride SR (KLOR-CON 10) tablet 10 mEq  10 mEq Oral NOW    methylPREDNISolone ((Solu-MEDROL) 1,000 mg in 0.9% sodium chloride 100 mL IVPB  1,000 mg IntraVENous NOW     Current Outpatient Medications   Medication Sig    ibuprofen (MOTRIN) 400 mg tablet     traMADoL (ULTRAM) 50 mg tablet Take 50 mg by mouth. Relevant other informations: Other medical conditions listed in this following active hospital problem list section; all of these and other pertinent data were taken into consideration when treatment plan is developed and customized to this patient's unique overall circumstances and needs. We have reviewed available old medical records within the constraints of this admission process. Present on Admission:   [facility] ([facility] sclerosis) [facility]   Multiple sclerosis exacerbation ([facility]   Hypothyroidism due to Hashimoto's thyroiditis   Fall at home   Anemia       Data Review:   Recent Days:  All Micro Results     None          Recent Labs     03/07/20  1313   WBC 4.5   HGB 8.8*   HCT 28.9*        Recent Labs     03/07/20  1313      K 3.3*      CO2 30   GLU 91   BUN 11   CREA 1.23*   CA 8.5   ALB 3.6   TBILI 0.4   SGOT 57*   ALT 37      Lab Results   Component Value Date/Time    TSH 43.78 (H) 03/07/2020 01:13 PM         Care Plan discussed with: Patient/Family and Nurse   Other medical conditions are listed in the active hospital problem list section; these and other pertinent data were taken into consideration when the treatment plan was developed and customized to this patient's unique overall circumstances and needs.     Hig complexity decision making was performed for this patient who is at high risk for decompensation with multiple organ involvement. Today total floor/unit time was 65  minutes while caring for this patient and greater than 50% of that time was spent with patient (and/or family) coordinating patients clinical issues.      Bethany Ortiz MD MPH  FACP                               3/7/2020       __________________________________________________________   FOR SOUND PHYSICIAN ADMINISTRATIVE USE ONLY   MDM       INPT 223      Tyshawn Uribe MD MPH FACP   3:28 PM  3/7/2020

## 2020-03-07 NOTE — PROGRESS NOTES
.. TRANSFER - IN REPORT:    Verbal report received from [place], RN (name) on [place]  being received from ED (unit) for routine progression of care      Report consisted of patients Situation, Background, Assessment and   Recommendations(SBAR). Information from the following report(s) SBAR, Kardex, MAR, Accordion and Recent Results was reviewed with the receiving nurse. Opportunity for questions and clarification was provided. Assessment completed upon patients arrival to unit and care assumed.

## 2020-03-07 NOTE — PROGRESS NOTES
1835) pt stated pain 8/10 back and spine. Sore tooth--difficult to chew. Vision slightly blurry but not as bad as past exacerbation. Pt stated broke up with boyfriend 3 months ago--started smoking again. Pt stated in the past that Prozac has helped with depression and smoking in the past.   1850) Discuss with Dr. Allie Syed, able to given [place] for severe pain. 1920) Bedside shift change report given to Marty Suresh RN (oncoming nurse) by Bryan Mas RN (offgoing nurse). Report included the following information SBAR, Kardex, MAR, Accordion and Recent Results.

## 2020-03-08 PROBLEM — F14.90 COCAINE USE: Status: ACTIVE | Noted: 2020-03-08

## 2020-03-08 LAB
25(OH)D3 SERPL-MCNC: 13.7 NG/ML (ref 30–100)
AMMONIA PLAS-SCNC: 10 UMOL/L
ANION GAP SERPL CALC-SCNC: 10 MMOL/L (ref 5–15)
BASOPHILS # BLD: 0 K/UL (ref 0–0.1)
BASOPHILS NFR BLD: 0 % (ref 0–1)
BUN SERPL-MCNC: 12 MG/DL (ref 6–20)
BUN/CREAT SERPL: 11 (ref 12–20)
CALCIUM SERPL-MCNC: 8.3 MG/DL (ref 8.5–10.1)
CHLORIDE SERPL-SCNC: 104 MMOL/L (ref 97–108)
CK SERPL-CCNC: 462 U/L (ref 26–192)
CO2 SERPL-SCNC: 25 MMOL/L (ref 21–32)
CREAT SERPL-MCNC: 1.08 MG/DL (ref 0.55–1.02)
DIFFERENTIAL METHOD BLD: ABNORMAL
EOSINOPHIL # BLD: 0 K/UL (ref 0–0.4)
EOSINOPHIL NFR BLD: 0 % (ref 0–7)
ERYTHROCYTE [DISTWIDTH] IN BLOOD BY AUTOMATED COUNT: 16.4 % (ref 11.5–14.5)
ERYTHROCYTE [SEDIMENTATION RATE] IN BLOOD: 20 MM/HR (ref 0–20)
GLUCOSE BLD STRIP.AUTO-MCNC: 114 MG/DL (ref 65–100)
GLUCOSE BLD STRIP.AUTO-MCNC: 114 MG/DL (ref 65–100)
GLUCOSE BLD STRIP.AUTO-MCNC: 155 MG/DL (ref 65–100)
GLUCOSE BLD STRIP.AUTO-MCNC: 199 MG/DL (ref 65–100)
GLUCOSE SERPL-MCNC: 161 MG/DL (ref 65–100)
HCT VFR BLD AUTO: 26 % (ref 35–47)
HGB BLD-MCNC: 8.1 G/DL (ref 11.5–16)
IMM GRANULOCYTES # BLD AUTO: 0 K/UL (ref 0–0.04)
IMM GRANULOCYTES NFR BLD AUTO: 1 % (ref 0–0.5)
LIPASE SERPL-CCNC: 219 U/L (ref 73–393)
LYMPHOCYTES # BLD: 0.8 K/UL (ref 0.8–3.5)
LYMPHOCYTES NFR BLD: 21 % (ref 12–49)
MAGNESIUM SERPL-MCNC: 2.4 MG/DL (ref 1.6–2.4)
MCH RBC QN AUTO: 25.6 PG (ref 26–34)
MCHC RBC AUTO-ENTMCNC: 31.2 G/DL (ref 30–36.5)
MCV RBC AUTO: 82 FL (ref 80–99)
MONOCYTES # BLD: 0 K/UL (ref 0–1)
MONOCYTES NFR BLD: 1 % (ref 5–13)
NEUTS SEG # BLD: 2.9 K/UL (ref 1.8–8)
NEUTS SEG NFR BLD: 77 % (ref 32–75)
NRBC # BLD: 0 K/UL (ref 0–0.01)
NRBC BLD-RTO: 0 PER 100 WBC
PLATELET # BLD AUTO: 235 K/UL (ref 150–400)
PMV BLD AUTO: 9.9 FL (ref 8.9–12.9)
POTASSIUM SERPL-SCNC: 3.6 MMOL/L (ref 3.5–5.1)
RBC # BLD AUTO: 3.17 M/UL (ref 3.8–5.2)
RBC MORPH BLD: ABNORMAL
RBC MORPH BLD: ABNORMAL
SERVICE CMNT-IMP: ABNORMAL
SODIUM SERPL-SCNC: 139 MMOL/L (ref 136–145)
VIT B12 SERPL-MCNC: 420 PG/ML (ref 193–986)
WBC # BLD AUTO: 3.7 K/UL (ref 3.6–11)

## 2020-03-08 PROCEDURE — 82607 VITAMIN B-12: CPT

## 2020-03-08 PROCEDURE — 97116 GAIT TRAINING THERAPY: CPT | Performed by: PHYSICAL THERAPIST

## 2020-03-08 PROCEDURE — 74011636637 HC RX REV CODE- 636/637: Performed by: INTERNAL MEDICINE

## 2020-03-08 PROCEDURE — 36415 COLL VENOUS BLD VENIPUNCTURE: CPT

## 2020-03-08 PROCEDURE — 82962 GLUCOSE BLOOD TEST: CPT

## 2020-03-08 PROCEDURE — 84155 ASSAY OF PROTEIN SERUM: CPT

## 2020-03-08 PROCEDURE — 97161 PT EVAL LOW COMPLEX 20 MIN: CPT | Performed by: PHYSICAL THERAPIST

## 2020-03-08 PROCEDURE — 85025 COMPLETE CBC W/AUTO DIFF WBC: CPT

## 2020-03-08 PROCEDURE — 82550 ASSAY OF CK (CPK): CPT

## 2020-03-08 PROCEDURE — 74011250637 HC RX REV CODE- 250/637: Performed by: INTERNAL MEDICINE

## 2020-03-08 PROCEDURE — 94760 N-INVAS EAR/PLS OXIMETRY 1: CPT

## 2020-03-08 PROCEDURE — 83690 ASSAY OF LIPASE: CPT

## 2020-03-08 PROCEDURE — 82140 ASSAY OF AMMONIA: CPT

## 2020-03-08 PROCEDURE — 85652 RBC SED RATE AUTOMATED: CPT

## 2020-03-08 PROCEDURE — 82306 VITAMIN D 25 HYDROXY: CPT

## 2020-03-08 PROCEDURE — 74011250636 HC RX REV CODE- 250/636: Performed by: PSYCHIATRY & NEUROLOGY

## 2020-03-08 PROCEDURE — 74011000258 HC RX REV CODE- 258: Performed by: PSYCHIATRY & NEUROLOGY

## 2020-03-08 PROCEDURE — 83735 ASSAY OF MAGNESIUM: CPT

## 2020-03-08 PROCEDURE — 65270000032 HC RM SEMIPRIVATE

## 2020-03-08 PROCEDURE — 74011250637 HC RX REV CODE- 250/637: Performed by: PSYCHIATRY & NEUROLOGY

## 2020-03-08 PROCEDURE — 80048 BASIC METABOLIC PNL TOTAL CA: CPT

## 2020-03-08 PROCEDURE — 74011250636 HC RX REV CODE- 250/636: Performed by: INTERNAL MEDICINE

## 2020-03-08 RX ORDER — TOPIRAMATE 25 MG/1
25 TABLET ORAL
Status: DISCONTINUED | OUTPATIENT
Start: 2020-03-08 | End: 2020-03-11 | Stop reason: HOSPADM

## 2020-03-08 RX ORDER — SUMATRIPTAN 25 MG/1
100 TABLET, FILM COATED ORAL AS NEEDED
Status: DISCONTINUED | OUTPATIENT
Start: 2020-03-08 | End: 2020-03-11 | Stop reason: HOSPADM

## 2020-03-08 RX ORDER — TIZANIDINE 4 MG/1
4 TABLET ORAL
Status: DISCONTINUED | OUTPATIENT
Start: 2020-03-08 | End: 2020-03-09

## 2020-03-08 RX ADMIN — Medication 10 ML: at 03:44

## 2020-03-08 RX ADMIN — Medication 10 ML: at 08:04

## 2020-03-08 RX ADMIN — ACETAMINOPHEN 500 MG: 500 TABLET ORAL at 22:15

## 2020-03-08 RX ADMIN — HYDROCODONE BITARTRATE AND ACETAMINOPHEN 1 TABLET: 5; 325 TABLET ORAL at 08:49

## 2020-03-08 RX ADMIN — HEPARIN SODIUM 5000 UNITS: 5000 INJECTION INTRAVENOUS; SUBCUTANEOUS at 10:48

## 2020-03-08 RX ADMIN — SODIUM CHLORIDE 1000 MG: 9 INJECTION, SOLUTION INTRAVENOUS at 15:46

## 2020-03-08 RX ADMIN — INSULIN LISPRO 2 UNITS: 100 INJECTION, SOLUTION INTRAVENOUS; SUBCUTANEOUS at 08:49

## 2020-03-08 RX ADMIN — Medication 100 MG: at 08:49

## 2020-03-08 RX ADMIN — HYDROCODONE BITARTRATE AND ACETAMINOPHEN 1 TABLET: 5; 325 TABLET ORAL at 15:47

## 2020-03-08 RX ADMIN — MAGNESIUM SULFATE HEPTAHYDRATE 1 G: 1 INJECTION, SOLUTION INTRAVENOUS at 03:37

## 2020-03-08 RX ADMIN — FOLIC ACID 1 MG: 1 TABLET ORAL at 08:49

## 2020-03-08 RX ADMIN — Medication 10 ML: at 14:31

## 2020-03-08 RX ADMIN — LEVOTHYROXINE SODIUM 50 MCG: 50 TABLET ORAL at 08:04

## 2020-03-08 RX ADMIN — TOPIRAMATE 25 MG: 25 TABLET ORAL at 22:15

## 2020-03-08 RX ADMIN — Medication 10 ML: at 22:16

## 2020-03-08 RX ADMIN — HYDROCODONE BITARTRATE AND ACETAMINOPHEN 1 TABLET: 5; 325 TABLET ORAL at 01:55

## 2020-03-08 RX ADMIN — TIZANIDINE 4 MG: 4 TABLET ORAL at 22:15

## 2020-03-08 NOTE — PROGRESS NOTES
Progress Note      Pt Name  Juany Pablo   Date of Birth 1985   Medical Record Number  [id number]      Age  29 y.o. PCP Unknown, Provider   Admit date:  3/7/2020    Room Number  213/01  @ [facility]   Date of Service  3/8/2020     Admission Diagnoses:  Multiple sclerosis exacerbation [facility])     Admission Summary:  \" The pt reports that she has had diagnosis of MS for long time and she had been followed by [facility] neurologists. Due to family reasons she came to [place] for over two years. sonce then she has not had any follow up with neurologist or PCP   She has not taken any MS or thyroid medications. \"     Interval history/progression:   03/08/20 - pt was evaluated by neurologist     Assessment and plan:     Present on Admission:   MS (multiple sclerosis) [facility]   Multiple sclerosis exacerbation ([facility]   Hypothyroidism due to Hashimoto's thyroiditis   Fall at home   Anemia   Tobacco abuse disorder   Chronic alcohol use        Syncope possibly due to   MS exacerbation -no follow up with neurologist for about 2 years   · Admit to INPT status   · IV Solu-medrol 1gram daily x 3 doses.    · SSI for expected hyperglycemia   · PT OT evaluation   · Neurologist consultation   · Fall precautions   · MRI CNS for evaluation of CNS status   · Will need OP follow up.      Hypothyroidism -pt has not had thyroid replacement for >2years; she reports that she was taking about 100mcg of syndthroid daily previously   · Resume synthroid at 50mcg/QAM      Chronic use of ETOH -  · CIWA protocol   · M/V IV   · Thiamine and folate PO         Anemia -possibly from menorrhagia and chronic diseases   · Supportive care.      Tobacco abuse disorder -<1PPD for \"many years \"  · Nicotine patch 14mg/day PRN     Cocaine abuse      Body mass index is 28.81 kg/m².         CODE STATUS  Full           Functional Status  Pt is disabled from her MS   She used to live in [facility], but she moved here with her 10,4,3 yo children. She now resides with her sister   She acknowledges smoking less than 1ppd for many years   She drinks about half a case of beer per day and occasionally drinks liquor    Surrogate decision maker: Pt's sister Ms. Bernice Khan   [phone]   Prophylaxis  Hep SQ   Discharge Plan: Home w/Family,     There are currently no Active Isolations Payor: [facility] MEDICAID / Plan: [address]  / Product Type: Managed Care Medicaid /    Social issues  Date Comment    03/07/20 3:37 PM No family or visitor here with the patient today      03/0820 11:55 AM No family or visitor here with the patient today             Prognosis  Fair           Subjective Data     \"OK; tired \"  Review of Systems - History obtained from the patient  Respiratory ROS: no cough, shortness of breath, or wheezing  Cardiovascular ROS: no chest pain or dyspnea on exertion    Objective Data       Comments  Pleasant cooperative; in no distress      Patient Vitals for the past 24 hrs:   BP   03/08/20 0805 120/86   03/07/20 2058 100/66   03/07/20 1827 101/65   03/07/20 1600 122/78   03/07/20 1428 111/74   03/07/20 1425 118/80   03/07/20 1420 115/86   03/07/20 1338 125/78      Patient Vitals for the past 24 hrs:   Pulse   03/08/20 0805 62   03/07/20 2058 60   03/07/20 1827 (!) 59   03/07/20 1428 (!) 59   03/07/20 1425 (!) 55   03/07/20 1420 (!) 53   03/07/20 1237 61      Patient Vitals for the past 24 hrs:   Resp   03/08/20 0805 16   03/07/20 2058 16   03/07/20 1827 16   03/07/20 1237 18      Patient Vitals for the past 24 hrs:   Temp   03/08/20 0805 98.2 °F (36.8 °C)   03/07/20 2058 97.3 °F (36.3 °C)   03/07/20 1827 97.3 °F (36.3 °C)   03/07/20 1237 98.1 °F (36.7 °C)        SpO2 Readings from Last 6 Encounters:   03/08/20 100%          O2 Device: Room air Body mass index is 28.81 kg/m².  -  Wt Readings from Last 10 Encounters:   03/07/20 68 kg (150 lb)        Physical Exam:             General:  Alert, cooperative,   well noursished,   well developed,   appears stated age    Ears/Eyes:  Hearing intact  Sclera anicteric. Pupils equal   Mouth/Throat:     Neck:     Lungs:  Trachea midline  Chest excursion symmetrical   Auscultation B/L Symmetrical with   Vesicular breath sounds     No Crepitations noted          CVS:  Regular rate and rhythm   no  murmur,   No click, rub or gallop  S1 normal   S2 normal   Pedal pulses  b/l symmetrical   Abdomen:  Obese  Soft, non-tender  Bowel sounds normal     Extremities:    No cyanosis, jaundice  No edema noted   No sign of DVT/cord like lesion on palpation  No sign of acute trauma .     Skin:    Skin color, texture, turgor normal. no acute rash or lesions    Lymph nodes:     Musculoskeletal Muscle bulk B/L symmetrical   Neuro Cranial nerves are intact,   motor movement b/l symmetrical,   Pt continues to have slight hypoasthesia of the LLE    Psych:  Alert and oriented,   normal mood & affect          Medications reviewed     Current Facility-Administered Medications   Medication Dose Route Frequency    tiZANidine (ZANAFLEX) tablet 4 mg  4 mg Oral QHS    SUMAtriptan (IMITREX) tablet 100 mg  100 mg Oral PRN    topiramate (TOPAMAX) tablet 25 mg  25 mg Oral QHS    sodium chloride (NS) flush 5-40 mL  5-40 mL IntraVENous Q8H    sodium chloride (NS) flush 5-40 mL  5-40 mL IntraVENous PRN    acetaminophen (TYLENOL) tablet 500 mg  500 mg Oral Q4H PRN    naloxone (NARCAN) injection 0.4 mg  0.4 mg IntraVENous PRN    ondansetron (ZOFRAN) injection 2 mg  2 mg IntraVENous Q6H PRN    bisacodyL (DULCOLAX) tablet 5 mg  5 mg Oral DAILY PRN    nicotine (NICODERM CQ) 7 mg/24 hr patch 1 Patch  1 Patch TransDERmal DAILY PRN    heparin (porcine) injection 5,000 Units  5,000 Units SubCUTAneous Q8H    levothyroxine (SYNTHROID) tablet 50 mcg  50 mcg Oral ACB    insulin lispro (HUMALOG) injection   SubCUTAneous AC&HS    glucose chewable tablet 16 g  4 Tab Oral PRN    glucagon (GLUCAGEN) injection 1 mg  1 mg IntraMUSCular PRN    dextrose 10% infusion 0-250 mL  0-250 mL IntraVENous PRN    LORazepam (ATIVAN) tablet 2 mg  2 mg Oral Q1H PRN    LORazepam (ATIVAN) tablet 4 mg  4 mg Oral Q1H PRN    LORazepam (ATIVAN) injection 1 mg  1 mg IntraVENous Q1H PRN    LORazepam (ATIVAN) injection 2 mg  2 mg IntraVENous K9R PRN    folic acid (FOLVITE) tablet 1 mg  1 mg Oral DAILY    thiamine HCL (B-1) tablet 100 mg  100 mg Oral DAILY    methylPREDNISolone ((Solu-MEDROL) 1,000 mg in 0.9% sodium chloride 100 mL IVPB  1,000 mg IntraVENous DAILY    HYDROcodone-acetaminophen (NORCO) 5-325 mg per tablet 1 Tab  1 Tab Oral Q6H PRN       Relevant other informations: Other medical conditions listed in [facility] hospital problem list section; all of these and other pertinent data were taken into consideration when treatment plan is developed and customized to this patient's unique overall circumstances and needs. We have reviewed available old medical records within the constraints of this admission process.         Data Review:   Recent Days:  All Micro Results     Procedure Component Value Units Date/Time    CULTURE, URINE [[id number]] Collected:  03/07/20 2210    Order Status:  Completed Updated:  03/08/20 1107          Recent Labs     03/08/20  0445 03/07/20  1313   WBC 3.7 4.5   HGB 8.1* 8.8*   HCT 26.0* 28.9*    249     Recent Labs     03/08/20  0445 03/07/20  1313    140   K 3.6 3.3*    104   CO2 25 30   * 91   BUN 12 11   CREA 1.08* 1.23*   CA 8.3* 8.5   MG 2.4  --    ALB  --  3.6   TBILI  --  0.4   SGOT  --  57*   ALT  --  37      Lab Results   Component Value Date/Time    TSH 43.78 (H) 03/07/2020 01:13 PM            Care Plan discussed with:Patient/Family and Nurse   Other medical conditions are listed in the active hospital problem list section; these and other pertinent data were taken into consideration when the treatment plan was developed and customized to this patient's unique overall circumstances and needs. High complexity decision making was performed for this patient who is at high risk for decompensation with multiple organ involvement. Today total floor/unit time was 35 minutes while caring for this patient and greater than 50% of that time was spent with patient (and/or family) coordinating patients clinical issues; this includes time spent during multidisciplinary rounds.         Berry Bernard MD MPH FACP    3/8/2020

## 2020-03-08 NOTE — PROGRESS NOTES
Problem: Mobility Impaired (Adult and Pediatric)  Goal: *Acute Goals and Plan of Care (Insert Text)  Description  FUNCTIONAL STATUS PRIOR TO ADMISSION: Patient was independent and active without use of DME.    HOME SUPPORT PRIOR TO ADMISSION: The patient lived with her 3 children but did not require assist. Her sister and niece will be moving in with her. Physical Therapy Goals  Initiated 3/8/2020  1. Patient will transfer from bed to chair and chair to bed with independence using the least restrictive device within 7 day(s). 2.  Patient will perform sit to stand with independence within 7 day(s). 3.  Patient will ambulate with supervision/set-up for 200 feet with the least restrictive device within 7 day(s). Outcome: Progressing Towards Goal     PHYSICAL THERAPY EVALUATION  Patient: Su Ramirez (29 y.o. female)  Date: 3/8/2020  Primary Diagnosis: MS (multiple sclerosis) ([facility] [G35]        Precautions:          ASSESSMENT  Based on the objective data described below, the patient presents with decreased balance, RLE strength, activity tolerance, and overall functional mobility. PMH of Multiple Sclerosis. Patient modified independent with bed mobility and transfers. Patient ambulated 150 feet with contact guard assistance. Two standing rest breaks provided. Patient had one loss of balance to the right that required contact guard assist to recover. Patient reports that her sister will be moving in to help. Recommend home health PT at discharge. Patient will benefit from skilled therapy intervention to address the above noted impairments. PLAN :  Recommendations and Planned Interventions: bed mobility training, transfer training, gait training, therapeutic exercises, neuromuscular re-education, patient and family training/education, and therapeutic activities      Frequency/Duration: Patient will be followed by physical therapy:  2-3 times a week to address goals.     Recommendation for discharge: (in order for the patient to meet his/her long term goals)  Physical therapy at least 2 days/week in the home AND ensure assist and/or supervision for safety with balance     IF patient discharges home will need the following DME: none         SUBJECTIVE:   Patient stated I feel okay.     OBJECTIVE DATA SUMMARY:   HISTORY:    Past Medical History:   Diagnosis Date    Anemia     Graves disease     Multiple sclerosis ([address]      Past Surgical History:   Procedure Laterality Date    HX  SECTION      x4 occurences    HX FREE SKIN GRAFT         Home Situation  Home Environment: Apartment  One/Two Story Residence: Two story  Living Alone: No(4 kids)  Support Systems: Family member(s)(sister)  Patient Expects to be Discharged to[de-identified] Apartment  Current DME Used/Available at Home: margarita Krishna(in [place] with mom)    EXAMINATION/PRESENTATION/DECISION MAKING:   Critical Behavior:   Alert and oriented x4    Hearing: Auditory  Auditory Impairment: None       Strength:     Generally decreased; functional  RLE weaker than LLE     Functional Mobility:  Bed Mobility:     Supine to Sit: Modified independent  Sit to Supine: Modified independent  Scooting: Modified independent    Transfers:  Sit to Stand: Modified independent  Stand to Sit: Modified independent       Balance:   Sitting: Intact  Standing: Impaired; Without support  Standing - Static: Good  Standing - Dynamic : Fair    Ambulation/Gait Training:  Distance (ft): 150 Feet (ft)  Assistive Device: Gait belt  Ambulation - Level of Assistance: Contact guard assistance  Gait Description (WDL): Exceptions to WDL  Gait Abnormalities: Decreased step clearance  Base of Support: Widened  Speed/Erinn: Pace decreased (<100 feet/min)  Step Length: Right shortened;Left shortened    Based on the above components, the patient evaluation is determined to be of the following complexity level: LOW     Pain Rating:  Right side of jaw, 2/10    Activity Tolerance: Fair  Please refer to the flowsheet for vital signs taken during this treatment. After treatment patient left in no apparent distress:   Supine in bed and Call bell within reach    COMMUNICATION/EDUCATION:   The patients plan of care was discussed with: Registered nurse. Fall prevention education was provided and the patient/caregiver indicated understanding., Patient/family have participated as able in goal setting and plan of care. , and Patient/family agree to work toward stated goals and plan of care.     Thank you for this referral.  Joel Deleon, PT   Time Calculation: 15 mins

## 2020-03-08 NOTE — PROGRESS NOTES
Care Plan Reviewed     Problem: Falls - Risk of  Goal: *Absence of Falls  Description  Document Serg Akbar Fall Risk and appropriate interventions in the flowsheet.   Outcome: Progressing Towards Goal  Note: Fall Risk Interventions:            Medication Interventions: Patient to call before getting OOB         History of Falls Interventions: Door open when patient unattended         Problem: Patient Education: Go to Patient Education Activity  Goal: Patient/Family Education  Outcome: Progressing Towards Goal     Problem: Pain  Goal: *Control of Pain  Outcome: Progressing Towards Goal     Problem: Patient Education: Go to Patient Education Activity  Goal: Patient/Family Education  Outcome: Progressing Towards Goal     Problem: Breathing Pattern - Ineffective  Goal: *Absence of hypoxia  Outcome: Progressing Towards Goal     Problem: Patient Education: Go to Patient Education Activity  Goal: Patient/Family Education  Outcome: Progressing Towards Goal

## 2020-03-08 NOTE — PROGRESS NOTES
[facility] Admission Pharmacy Medication Reconciliation     Findings:     Use list below for information only. Patient should be on these medications but has been taking them as directed. Information obtained from: Patient interview    Past Medical History/Disease States:  Past Medical History:   Diagnosis Date    Anemia     Graves disease     Multiple sclerosis ([facility] [address]          Patient allergies: Allergies as of 2020 - Review Complete 2020   Allergen Reaction Noted    Onion Anaphylaxis 2020    Raspberry Anaphylaxis 2020    Caffeine Other (comments) 2020         Prior to Admission Medications   Prescriptions Last Dose      interferon beta-1b (BETASERON) 0.3 mg injection Over a year ago      Si.25 mg by SubCUTAneous route every other day. levothyroxine (SYNTHROID) 50 mcg tablet August      Sig: Take 50 mcg by mouth Daily (before breakfast).         Thank you,  Tejas Powell, [facility]

## 2020-03-08 NOTE — PROGRESS NOTES
Problem: Falls - Risk of  Goal: *Absence of Falls  Description  Document Lissette Dannyjude Fall Risk and appropriate interventions in the flowsheet.   Outcome: Progressing Towards Goal  Note: Fall Risk Interventions:            Medication Interventions: Patient to call before getting OOB, Teach patient to arise slowly         History of Falls Interventions: Door open when patient unattended         Problem: Patient Education: Go to Patient Education Activity  Goal: Patient/Family Education  Outcome: Progressing Towards Goal

## 2020-03-08 NOTE — CONSULTS
NEUROLOGY CONSULTATION    DATE OF CONSULTATION: 3/8/2020    CONSULTED BY: Dr. Nikhil Garcia: Multiple sclerosis exacerbation      HISTORY OF PRESENT ILLNESS  Brent Carty is a 29 y.o. right-handed black female with history of multiple sclerosis, migraine headache, Graves' disease with hypothyroidism, who presented today emergency room with left leg weakness, fall and loss of consciousness. According to patient, she was diagnosed with multiple sclerosis for more than 5 years, patient was initially on Betaseron, has not been on DMD for about 2 years. She was living in [place] but 2018 she became pregnant and apparently stopped her medication then moved to [place], but after she has her baby she did not establish with the neurologist and never started back on any DMD.  She says however she was doing fairly well up until the day prior to presentation when she had a blackout spell from the stairs, subsequently has been having weakness of the lower extremity, and persistent headache. Patient says she was unable to reestablish with a neurologist or primary care physician because she was having problem with transportation and has 3 children 7, 3 and 1 year, and she does not drive. She says she is also experiencing numbness and tingling sensation, headache has been persistent, headache is throbbing in nature, mostly on the left side of the head, stated with dizziness, blurry vision nausea, photophobia, phonophobia. She noted that she has not had mechanical symptoms she is having in a long time. Due to patient's increased symptoms, she was admitted and neurology consulted for further evaluation and management.   Review of Systems - General ROS: positive for  - fatigue and sleep disturbance  Psychological ROS: positive for - anxiety, concentration difficulties, depression, mood swings and sleep disturbances  Ophthalmic ROS: positive for - blurry vision, decreased vision, double vision and photophobia  ENT ROS: positive for - headaches, tinnitus, vertigo and visual changes  Allergy and Immunology ROS: negative  Hematological and Lymphatic ROS: negative  Endocrine ROS: negative  Respiratory ROS: no cough, shortness of breath, or wheezing  Cardiovascular ROS: no chest pain or dyspnea on exertion  Gastrointestinal ROS: no abdominal pain, change in bowel habits, or black or bloody stools  Genito-Urinary ROS: no dysuria, trouble voiding, or hematuria  Musculoskeletal ROS: positive for - gait disturbance, joint pain, joint stiffness, muscle pain and muscular weakness  Neurological ROS: positive for - dizziness, gait disturbance, headaches, impaired coordination/balance, numbness/tingling, visual changes and weakness  Dermatological ROS: negative        PMH  Past Medical History:   Diagnosis Date    Anemia     Graves disease     Multiple sclerosis ([facility] [address]        SH  Social History     Socioeconomic History    Marital status: SINGLE     Spouse name: Not on file    Number of children: Not on file    Years of education: Not on file    Highest education level: Not on file   Tobacco Use    Smoking status: Current Some Day Smoker     Packs/day: 1.00     Years: 15.00     Pack years: 15.00    Smokeless tobacco: Never Used   Substance and Sexual Activity    Alcohol use: Yes     Comment: weekens only    Drug use: Not Currently    Sexual activity: Not Currently       FH  History reviewed. No pertinent family history.     ALLERGIES  Allergies   Allergen Reactions    Onion Anaphylaxis    Raspberry Anaphylaxis    Caffeine Other (comments)     Reports \"bad shaking\"       CURRENT MEDS  Current Facility-Administered Medications   Medication Dose Route Frequency Provider Last Rate Last Dose    sodium chloride (NS) flush 5-40 mL  5-40 mL IntraVENous Q8H Reji Colon MD   10 mL at 03/08/20 0804    sodium chloride (NS) flush 5-40 mL  5-40 mL IntraVENous PRN Reji Colon MD        acetaminophen (TYLENOL) tablet 500 mg  500 mg Oral Q4H PRN Tressa Mendez MD   500 mg at 03/07/20 1642    naloxone (NARCAN) injection 0.4 mg  0.4 mg IntraVENous PRN Tressa Mendez MD        ondansetron [facility]) injection 2 mg  2 mg IntraVENous Q6H PRN Tressa Mendez MD        bisacodyL (DULCOLAX) tablet 5 mg  5 mg Oral DAILY PRN MD Joao Campos nicotine (NICODERM CQ) 7 mg/24 hr patch 1 Patch  1 Patch TransDERmal DAILY PRN Tressa Mendez MD        heparin (porcine) injection 5,000 Units  5,000 Units SubCUTAneous Q8H Tressa Mendez MD   5,000 Units at 03/08/20 1048    levothyroxine (SYNTHROID) tablet 50 mcg  50 mcg Oral ACB Tressa Mendez MD   50 mcg at 03/08/20 0804    insulin lispro (HUMALOG) injection   SubCUTAneous AC&HS Tressa Mendez MD   2 Units at 03/08/20 0849    glucose chewable tablet 16 g  4 Tab Oral PRN Tressa Mendez MD        glucagon (GLUCAGEN) injection 1 mg  1 mg IntraMUSCular PRN Tressa Mendez MD        dextrose 10% infusion 0-250 mL  0-250 mL IntraVENous PRN Tressa Mendez MD        LORazepam (ATIVAN) tablet 2 mg  2 mg Oral Q1H PRN Tressa Mendez MD        LORazepam (ATIVAN) tablet 4 mg  4 mg Oral Q1H PRN Tressa Mendez MD        LORazepam (ATIVAN) injection 1 mg  1 mg IntraVENous Q1H PRN Tressa Mendez MD        LORazepam (ATIVAN) injection 2 mg  2 mg IntraVENous Q1H PRN Tressa Mendez MD        folic acid (FOLVITE) tablet 1 mg  1 mg Oral DAILY Tressa Mendez MD   1 mg at 03/08/20 5837    thiamine HCL (B-1) tablet 100 mg  100 mg Oral DAILY Tressa Mendez MD   100 mg at 03/08/20 0849    methylPREDNISolone ((Solu-MEDROL) 1,000 mg in 0.9% sodium chloride 100 mL IVPB  1,000 mg IntraVENous DAILY Tressa Mendez MD        HYDROcodone-acetaminophen [facility]) 5-325 mg per tablet 1 Tab  1 Tab Oral Q6H PRN Tressa Mendez MD   1 Tab at 03/08/20 0849       ROS  As per HPI  LABS  Recent Results (from the past 50 hour(s))   CBC WITH AUTOMATED DIFF    Collection Time: 03/07/20  1:13 PM   Result Value Ref Range    WBC 4.5 3.6 - 11.0 K/uL    RBC 3.46 (L) 3.80 - 5.20 M/uL    HGB 8.8 (L) 11.5 - 16.0 g/dL    HCT 28.9 (L) 35.0 - 47.0 %    MCV 83.5 80.0 - 99.0 FL    MCH 25.4 (L) 26.0 - 34.0 PG    MCHC 30.4 30.0 - 36.5 g/dL    RDW 16.5 (H) 11.5 - 14.5 %    PLATELET 298 397 - 553 K/uL    MPV 9.7 8.9 - 12.9 FL    NRBC 0.0 0  WBC    ABSOLUTE NRBC 0.00 0.00 - 0.01 K/uL    NEUTROPHILS 55 32 - 75 %    LYMPHOCYTES 35 12 - 49 %    MONOCYTES 9 5 - 13 %    EOSINOPHILS 1 0 - 7 %    BASOPHILS 0 0 - 1 %    IMMATURE GRANULOCYTES 0 0.0 - 0.5 %    ABS. NEUTROPHILS 2.4 1.8 - 8.0 K/UL    ABS. LYMPHOCYTES 1.6 0.8 - 3.5 K/UL    ABS. MONOCYTES 0.4 0.0 - 1.0 K/UL    ABS. EOSINOPHILS 0.0 0.0 - 0.4 K/UL    ABS. BASOPHILS 0.0 0.0 - 0.1 K/UL    ABS. IMM. GRANS. 0.0 0.00 - 0.04 K/UL    DF AUTOMATED     METABOLIC PANEL, COMPREHENSIVE    Collection Time: 03/07/20  1:13 PM   Result Value Ref Range    Sodium 140 136 - 145 mmol/L    Potassium 3.3 (L) 3.5 - 5.1 mmol/L    Chloride 104 97 - 108 mmol/L    CO2 30 21 - 32 mmol/L    Anion gap 6 5 - 15 mmol/L    Glucose 91 65 - 100 mg/dL    BUN 11 6 - 20 MG/DL    Creatinine 1.23 (H) 0.55 - 1.02 MG/DL    BUN/Creatinine ratio 9 (L) 12 - 20      GFR est AA >60 >60 ml/min/1.73m2    GFR est non-AA 50 (L) >60 ml/min/1.73m2    Calcium 8.5 8.5 - 10.1 MG/DL    Bilirubin, total 0.4 0.2 - 1.0 MG/DL    ALT (SGPT) 37 12 - 78 U/L    AST (SGOT) 57 (H) 15 - 37 U/L    Alk.  phosphatase 82 45 - 117 U/L    Protein, total 7.0 6.4 - 8.2 g/dL    Albumin 3.6 3.5 - 5.0 g/dL    Globulin 3.4 2.0 - 4.0 g/dL    A-G Ratio 1.1 1.1 - 2.2     SED RATE (ESR)    Collection Time: 03/07/20  1:13 PM   Result Value Ref Range    Sed rate, automated 21 (H) 0 - 20 mm/hr   CK    Collection Time: 03/07/20  1:13 PM   Result Value Ref Range     (H) 26 - 192 U/L   INFLUENZA A+B VIRAL AGS    Collection Time: 03/07/20  1:13 PM   Result Value Ref Range    Influenza A Antigen NEGATIVE  NEG      Influenza B Antigen NEGATIVE  NEG     TROPONIN I    Collection Time: 03/07/20  1:13 PM   Result Value Ref Range    Troponin-I, Qt. <0.05 <0.05 ng/mL   ETHYL ALCOHOL    Collection Time: 03/07/20  1:13 PM   Result Value Ref Range    ALCOHOL(ETHYL),SERUM <10 <10 MG/DL   LIPASE    Collection Time: 03/07/20  1:13 PM   Result Value Ref Range    Lipase 237 73 - 393 U/L   TSH 3RD GENERATION    Collection Time: 03/07/20  1:13 PM   Result Value Ref Range    TSH 43.78 (H) 0.36 - 3.74 uIU/mL   SED RATE (ESR)    Collection Time: 03/07/20  1:13 PM   Result Value Ref Range    Sed rate, automated 19 0 - 20 mm/hr   EKG, 12 LEAD, INITIAL    Collection Time: 03/07/20  1:22 PM   Result Value Ref Range    Ventricular Rate 51 BPM    Atrial Rate 51 BPM    P-R Interval 226 ms    QRS Duration 84 ms    Q-T Interval 418 ms    QTC Calculation (Bezet) 385 ms    Calculated P Axis 64 degrees    Calculated R Axis 6 degrees    Calculated T Axis 39 degrees    Diagnosis       Sinus bradycardia with 1st degree AV block  Low voltage QRS  Borderline ECG  No previous ECGs available     IRON PROFILE    Collection Time: 03/07/20  3:45 PM   Result Value Ref Range    Iron 16 (L) 35 - 150 ug/dL    TIBC 468 (H) 250 - 450 ug/dL    Iron % saturation 3 (L) 20 - 50 %   T3, FREE    Collection Time: 03/07/20  3:45 PM   Result Value Ref Range    Free Triiodothyronine (T3) <0.5 (L) 2.2 - 4.0 pg/mL   T4, FREE    Collection Time: 03/07/20  3:45 PM   Result Value Ref Range    T4, Free 0.2 (L) 0.8 - 1.5 NG/DL   GLUCOSE, POC    Collection Time: 03/07/20  7:09 PM   Result Value Ref Range    Glucose (POC) 205 (H) 65 - 100 mg/dL    Performed by Tessy Dykes    URINALYSIS W/ REFLEX CULTURE    Collection Time: 03/07/20  9:10 PM   Result Value Ref Range    Color DARK YELLOW      Appearance CLOUDY (A) CLEAR      Specific gravity 1.025 1.003 - 1.030      pH (UA) 6.0 5.0 - 8.0      Protein TRACE (A) NEG mg/dL    Glucose NEGATIVE  NEG mg/dL    Ketone NEGATIVE  NEG mg/dL    Bilirubin NEGATIVE  NEG      Blood NEGATIVE  NEG      Urobilinogen 1.0 0.2 - 1.0 EU/dL    Nitrites POSITIVE (A) NEG      Leukocyte Esterase MODERATE (A) NEG      WBC 5-10 0 - 4 /hpf    RBC 0-5 0 - 5 /hpf    Epithelial cells MODERATE (A) FEW /lpf    Bacteria 4+ (A) NEG /hpf    UA:UC IF INDICATED URINE CULTURE ORDERED (A) CNI     DRUG SCREEN, URINE    Collection Time: 03/07/20  9:10 PM   Result Value Ref Range    AMPHETAMINES NEGATIVE  NEG      BARBITURATES NEGATIVE  NEG      BENZODIAZEPINES NEGATIVE  NEG      COCAINE POSITIVE (A) NEG      METHADONE NEGATIVE  NEG      OPIATES NEGATIVE  NEG      PCP(PHENCYCLIDINE) NEGATIVE  NEG      THC (TH-CANNABINOL) NEGATIVE  NEG      Drug screen comment (NOTE)    GLUCOSE, POC    Collection Time: 03/07/20 10:24 PM   Result Value Ref Range    Glucose (POC) 143 (H) 65 - 100 mg/dL    Performed by [facility]    METABOLIC PANEL, BASIC    Collection Time: 03/08/20  4:45 AM   Result Value Ref Range    Sodium 139 136 - 145 mmol/L    Potassium 3.6 3.5 - 5.1 mmol/L    Chloride 104 97 - 108 mmol/L    CO2 25 21 - 32 mmol/L    Anion gap 10 5 - 15 mmol/L    Glucose 161 (H) 65 - 100 mg/dL    BUN 12 6 - 20 MG/DL    Creatinine 1.08 (H) 0.55 - 1.02 MG/DL    BUN/Creatinine ratio 11 (L) 12 - 20      GFR est AA >60 >60 ml/min/1.73m2    GFR est non-AA 58 (L) >60 ml/min/1.73m2    Calcium 8.3 (L) 8.5 - 10.1 MG/DL   CBC WITH AUTOMATED DIFF    Collection Time: 03/08/20  4:45 AM   Result Value Ref Range    WBC 3.7 3.6 - 11.0 K/uL    RBC 3.17 (L) 3.80 - 5.20 M/uL    HGB 8.1 (L) 11.5 - 16.0 g/dL    HCT 26.0 (L) 35.0 - 47.0 %    MCV 82.0 80.0 - 99.0 FL    MCH 25.6 (L) 26.0 - 34.0 PG    MCHC 31.2 30.0 - 36.5 g/dL    RDW 16.4 (H) 11.5 - 14.5 %    PLATELET 050 984 - 895 K/uL    MPV 9.9 8.9 - 12.9 FL    NRBC 0.0 0  WBC    ABSOLUTE NRBC 0.00 0.00 - 0.01 K/uL    NEUTROPHILS 77 (H) 32 - 75 %    LYMPHOCYTES 21 12 - 49 %    MONOCYTES 1 (L) 5 - 13 %    EOSINOPHILS 0 0 - 7 %    BASOPHILS 0 0 - 1 %    IMMATURE GRANULOCYTES 1 (H) 0.0 - 0.5 %    ABS.  NEUTROPHILS 2.9 1.8 - 8.0 K/UL ABS. LYMPHOCYTES 0.8 0.8 - 3.5 K/UL    ABS. MONOCYTES 0.0 0.0 - 1.0 K/UL    ABS. EOSINOPHILS 0.0 0.0 - 0.4 K/UL    ABS. BASOPHILS 0.0 0.0 - 0.1 K/UL    ABS. IMM. GRANS. 0.0 0.00 - 0.04 K/UL    DF SMEAR SCANNED      RBC COMMENTS ANISOCYTOSIS  1+        RBC COMMENTS HYPOCHROMIA  1+       MAGNESIUM    Collection Time: 03/08/20  4:45 AM   Result Value Ref Range    Magnesium 2.4 1.6 - 2.4 mg/dL   AMMONIA    Collection Time: 03/08/20  4:45 AM   Result Value Ref Range    Ammonia 10 <32 UMOL/L   LIPASE    Collection Time: 03/08/20  4:45 AM   Result Value Ref Range    Lipase 219 73 - 393 U/L   GLUCOSE, POC    Collection Time: 03/08/20  8:35 AM   Result Value Ref Range    Glucose (POC) 155 (H) 65 - 100 mg/dL    Performed by Claudette Lyell         PHYSICAL EXAM  Visit Vitals  /86 (BP 1 Location: Right arm, BP Patient Position: At rest)   Pulse 62   Temp 98.2 °F (36.8 °C)   Resp 16   Ht 5' 0.5\" (1.537 m)   Wt 150 lb (68 kg)   LMP 02/12/2020   SpO2 100%   BMI 28.81 kg/m²     General:  Alert, cooperative, no distress. Head:  Normocephalic, without obvious abnormality, atraumatic. Eyes:  Conjunctivae/corneas clear. Pupils equal, round, reactive to light. Extraocular movements intact, VFF, NO papilledema   Lungs:  Heart:   Non labored breathing  Regular rate and rhythm, no carotid bruits   Abdomen:   Soft, non-distended   Extremities: Extremities normal, atraumatic, no cyanosis or edema. Pulses: 2+ and symmetric all extremities. Skin: Skin color, texture, turgor normal. No rashes or lesions.    Neurologic:  Gen: Attention normal             Language: naming, repetition, fluency normal             Memory: intact recent and remote memory  Cranial Nerves:  I: smell Not tested   II: visual fields Full to confrontation   II: pupils Equal, round, reactive to light   II: optic disc No papilledema   III,VII: ptosis none   III,IV,VI: extraocular muscles  Full ROM   V: mastication normal   V: facial light touch sensation normal   VII: facial muscle function   symmetric   VIII: hearing symmetric   IX: soft palate elevation  normal   XI: trapezius strength  5/5   XI: sternocleidomastoid strength 5/5   XI: neck flexion strength  5/5   XII: tongue  midline     Motor: normal bulk and tone, no tremor              Strength: 4/5 left lower extremity, 5-/5 rest of the  extremities  Sensory: Dysesthesia to LT, PP, vibration, and temperature  Coordination: FTN and HTS abnormal  Gait: Not assessed  Reflexes: 2+ throughout       IMPRESSION:  Multiple sclerosis exacerbation  History of multiple sclerosis  Frequent fall  Gait disorder  Migraine headache  Cervical myelopathy    RECOMMENDATIONS:  MRI of the brain with and without the gadolinium  MRI of the cervical spine with and without gadolinium  Solu-Medrol IV 1 g for 3 days  Topamax 25 mg p.o. nightly  Imitrex 100 mg p.o. PRN for severe headache  PT/OT evaluation  Labs for autoimmune work-up, vitamin B12, vitamin D, ESR, CK, aldolase  VTE prophylaxis           Thank you very much for this consultation.      Lucy Maier MD

## 2020-03-08 NOTE — PROGRESS NOTES
Reason for Admission:  Per Yobani Galan (ED PA) admit note: \"presents ambulatory to the ED with cc of subacute moderate worsening generalized body aches, dizziness, lightheadedness, blurred vision, headaches, generalized weakness X 2 months. \" Admitting Dx: Multiple sclerosis exacerbation                    RUR Score:  11% - zero ED and IP visits in last 6  months                Plan for utilizing home health:   TBD - CM to meet with pt and medical team to assist with d/c planning    PCP: First and Last name:  None   Name of Practice:    Are you a current patient: Yes/No:    Approximate date of last visit:                     Current Advanced Directive/Advance Care Plan: Full code  no documents in chart Danette 8: Rich Valencia Boyfriend   [phone]                         Transition of Care Plan: CM reviewed pt chart. Pt was admitting on Inpatient Status. She has medical insurance coverage through [facility] of [address] Medicaid. Per Dr. Debora Garcia H&P Note: \"Pt moved to [facility] over 2 years ago, from [place] with her 3 children, to live with her sister. The pt reports that she has had diagnosis of MS for long time and she had been followed by [facility] neurologists. Due to family reasons she came to [facility] for over two years. sonce then she has not had any follow up with neurologist or PCP. \" CM to with pt to complete initial assessment and assist medical team with d/c planning.           Care Management Interventions  PCP Verified by CM: No(No PCP  documented)  Mode of Transport at Discharge: (TBD - CM to meet with pt to complete initial assessment)  Transition of Care Consult (CM Consult): Discharge Planning  Current Support Network: Relative's Home(resides with her sister )  Confirm Follow Up Transport: (TBD - CM to meet with pt to complete initial assessment)  Discharge Location  Discharge Placement: Home

## 2020-03-09 ENCOUNTER — APPOINTMENT (OUTPATIENT)
Dept: MRI IMAGING | Age: 35
DRG: 043 | End: 2020-03-09
Attending: INTERNAL MEDICINE
Payer: MEDICAID

## 2020-03-09 ENCOUNTER — APPOINTMENT (OUTPATIENT)
Dept: MRI IMAGING | Age: 35
DRG: 043 | End: 2020-03-09
Attending: PSYCHIATRY & NEUROLOGY
Payer: MEDICAID

## 2020-03-09 LAB
ATRIAL RATE: 51 BPM
CALCULATED P AXIS, ECG09: 64 DEGREES
CALCULATED R AXIS, ECG10: 6 DEGREES
CALCULATED T AXIS, ECG11: 39 DEGREES
DIAGNOSIS, 93000: NORMAL
GLUCOSE BLD STRIP.AUTO-MCNC: 124 MG/DL (ref 65–100)
GLUCOSE BLD STRIP.AUTO-MCNC: 129 MG/DL (ref 65–100)
GLUCOSE BLD STRIP.AUTO-MCNC: 148 MG/DL (ref 65–100)
GLUCOSE BLD STRIP.AUTO-MCNC: 161 MG/DL (ref 65–100)
P-R INTERVAL, ECG05: 226 MS
Q-T INTERVAL, ECG07: 418 MS
QRS DURATION, ECG06: 84 MS
QTC CALCULATION (BEZET), ECG08: 385 MS
SERVICE CMNT-IMP: ABNORMAL
VENTRICULAR RATE, ECG03: 51 BPM

## 2020-03-09 PROCEDURE — 74011250637 HC RX REV CODE- 250/637: Performed by: INTERNAL MEDICINE

## 2020-03-09 PROCEDURE — 74011000250 HC RX REV CODE- 250: Performed by: PSYCHIATRY & NEUROLOGY

## 2020-03-09 PROCEDURE — 70553 MRI BRAIN STEM W/O & W/DYE: CPT

## 2020-03-09 PROCEDURE — 74011250637 HC RX REV CODE- 250/637: Performed by: PSYCHIATRY & NEUROLOGY

## 2020-03-09 PROCEDURE — 74011636637 HC RX REV CODE- 636/637: Performed by: INTERNAL MEDICINE

## 2020-03-09 PROCEDURE — 74011250636 HC RX REV CODE- 250/636: Performed by: STUDENT IN AN ORGANIZED HEALTH CARE EDUCATION/TRAINING PROGRAM

## 2020-03-09 PROCEDURE — 72156 MRI NECK SPINE W/O & W/DYE: CPT

## 2020-03-09 PROCEDURE — A9575 INJ GADOTERATE MEGLUMI 0.1ML: HCPCS | Performed by: STUDENT IN AN ORGANIZED HEALTH CARE EDUCATION/TRAINING PROGRAM

## 2020-03-09 PROCEDURE — 82962 GLUCOSE BLOOD TEST: CPT

## 2020-03-09 PROCEDURE — 74011250636 HC RX REV CODE- 250/636: Performed by: INTERNAL MEDICINE

## 2020-03-09 PROCEDURE — 97165 OT EVAL LOW COMPLEX 30 MIN: CPT | Performed by: OCCUPATIONAL THERAPIST

## 2020-03-09 PROCEDURE — 97535 SELF CARE MNGMENT TRAINING: CPT | Performed by: OCCUPATIONAL THERAPIST

## 2020-03-09 PROCEDURE — 94760 N-INVAS EAR/PLS OXIMETRY 1: CPT

## 2020-03-09 PROCEDURE — 74011000258 HC RX REV CODE- 258: Performed by: PSYCHIATRY & NEUROLOGY

## 2020-03-09 PROCEDURE — 74011250636 HC RX REV CODE- 250/636: Performed by: PSYCHIATRY & NEUROLOGY

## 2020-03-09 PROCEDURE — 74011250637 HC RX REV CODE- 250/637: Performed by: STUDENT IN AN ORGANIZED HEALTH CARE EDUCATION/TRAINING PROGRAM

## 2020-03-09 PROCEDURE — 65270000032 HC RM SEMIPRIVATE

## 2020-03-09 RX ORDER — ERGOCALCIFEROL 1.25 MG/1
50000 CAPSULE ORAL
Status: DISCONTINUED | OUTPATIENT
Start: 2020-03-10 | End: 2020-03-10

## 2020-03-09 RX ORDER — GADOTERATE MEGLUMINE 376.9 MG/ML
14 INJECTION INTRAVENOUS
Status: COMPLETED | OUTPATIENT
Start: 2020-03-09 | End: 2020-03-09

## 2020-03-09 RX ORDER — ACETAZOLAMIDE 500 MG/5ML
500 INJECTION, POWDER, LYOPHILIZED, FOR SOLUTION INTRAVENOUS ONCE
Status: DISCONTINUED | OUTPATIENT
Start: 2020-03-09 | End: 2020-03-09 | Stop reason: SDUPTHER

## 2020-03-09 RX ORDER — CEPHALEXIN 250 MG/1
500 CAPSULE ORAL 2 TIMES DAILY
Status: DISCONTINUED | OUTPATIENT
Start: 2020-03-09 | End: 2020-03-11 | Stop reason: HOSPADM

## 2020-03-09 RX ADMIN — HYDROCODONE BITARTRATE AND ACETAMINOPHEN 1 TABLET: 5; 325 TABLET ORAL at 17:29

## 2020-03-09 RX ADMIN — ACETAMINOPHEN 500 MG: 500 TABLET ORAL at 14:58

## 2020-03-09 RX ADMIN — FOLIC ACID 1 MG: 1 TABLET ORAL at 08:45

## 2020-03-09 RX ADMIN — Medication 10 ML: at 21:31

## 2020-03-09 RX ADMIN — HEPARIN SODIUM 5000 UNITS: 5000 INJECTION INTRAVENOUS; SUBCUTANEOUS at 21:30

## 2020-03-09 RX ADMIN — SODIUM CHLORIDE 1000 MG: 900 INJECTION, SOLUTION INTRAVENOUS at 14:59

## 2020-03-09 RX ADMIN — HEPARIN SODIUM 5000 UNITS: 5000 INJECTION INTRAVENOUS; SUBCUTANEOUS at 11:24

## 2020-03-09 RX ADMIN — INSULIN LISPRO 2 UNITS: 100 INJECTION, SOLUTION INTRAVENOUS; SUBCUTANEOUS at 17:24

## 2020-03-09 RX ADMIN — Medication 5 ML: at 07:30

## 2020-03-09 RX ADMIN — CEPHALEXIN 500 MG: 250 CAPSULE ORAL at 17:23

## 2020-03-09 RX ADMIN — GADOTERATE MEGLUMINE 14 ML: 376.9 INJECTION INTRAVENOUS at 10:32

## 2020-03-09 RX ADMIN — HYDROCODONE BITARTRATE AND ACETAMINOPHEN 1 TABLET: 5; 325 TABLET ORAL at 08:45

## 2020-03-09 RX ADMIN — Medication 100 MG: at 08:45

## 2020-03-09 RX ADMIN — ACETAZOLAMIDE 500 MG: 500 INJECTION, POWDER, LYOPHILIZED, FOR SOLUTION INTRAVENOUS at 21:31

## 2020-03-09 RX ADMIN — LEVOTHYROXINE SODIUM 50 MCG: 50 TABLET ORAL at 08:04

## 2020-03-09 RX ADMIN — TOPIRAMATE 25 MG: 25 TABLET ORAL at 21:31

## 2020-03-09 RX ADMIN — Medication 10 ML: at 14:00

## 2020-03-09 NOTE — ACP (ADVANCE CARE PLANNING)
Request by staff to assist with Advance Medical Directive (AMD) in room 213. Consulted with patients nurse. Explained document to patient. Assisted patient in completing the document. Made a copy for patients chart and handed it to the unit secretary. Returned original document and another copy to the patient. The patient mentioned that she had thought about having an AMD on file for some time now, and believed it was thing right thing to do. She mentioned her children and since they were young that it would not be a decision for them. She expressed relief in knowing that the document would be on file. She was appreciative of time spent to go over details regarding her care. The patient named her mother Yevgeniy Daniel as a primary decision maker of her care. [address] [facility] [address].   [facility] service [phone])

## 2020-03-09 NOTE — PROGRESS NOTES
Problem: Falls - Risk of  Goal: *Absence of Falls  Description  Document Shivapratik Noel Fall Risk and appropriate interventions in the flowsheet.   Outcome: Progressing Towards Goal  Note: Fall Risk Interventions:            Medication Interventions: Patient to call before getting OOB         History of Falls Interventions: Bed/chair exit alarm         Problem: Patient Education: Go to Patient Education Activity  Goal: Patient/Family Education  Outcome: Progressing Towards Goal

## 2020-03-09 NOTE — PROGRESS NOTES
Spiritual Care Assessment/Progress Note  [facility]      Feliciano Mancilla      MRN: [id number]  AGE: 29 y.o.  SEX: female  Anglican Affiliation: Shirlene Lux   Language: English     3/9/2020     Total Time (in minutes): 39     Spiritual Assessment begun in [address] through conversation with:         [x]Patient        [] Family    [] Friend(s)        Reason for Consult: Initial/Spiritual assessment, patient floor, Advance medical directive consult     Spiritual beliefs: (Please include comment if needed)     [x] Identifies with a judy tradition:         [] Supported by a judy community:            [] Claims no spiritual orientation:           [] Seeking spiritual identity:                [] Adheres to an individual form of spirituality:           [] Not able to assess:                           Identified resources for coping:      [] Prayer                               [] Music                  [] Guided Imagery     [x] Family/friends                 [] Pet visits     [] Devotional reading                         [] Unknown     [] Other:                                               Interventions offered during this visit: (See comments for more details)    Patient Interventions: Advance medical directive completed, Affirmation of judy, Coping skills reviewed/reinforced, End of life issues discussed, Iconic (affirming the presence of God/Higher Power), Prayer (assurance of), Initial/Spiritual assessment, patient floor, Anglican beliefs/image of God discussed           Plan of Care:     [] Support spiritual and/or cultural needs    [x] Support AMD and/or advance care planning process      [] Support grieving process   [] Coordinate Rites and/or Rituals    [] Coordination with community clergy   [] No spiritual needs identified at this time   [] Detailed Plan of Care below (See Comments)  [] Make referral to Music Therapy  [] Make referral to Pet Therapy     [] Make referral to Addiction services  [] Make referral to [facility]  [] Make referral to Spiritual Care Partner  [] No future visits requested        [x] Follow up visits as needed     Comments: The purpose of the visit was to do an AMD on the patient. The patient was not being visited by anyone however, she was being attend to by her nurse, yet she welcomed the visit. She did express having judy in God and a relationship with her Catholic. She mentioned her children and not wanting her children to see her suffer while having to deal with the pain connected with any suffering that she might have to go through. The  provided the ministry of presence and the assurance of prayer to the patient. As well the  assisted the patient in completing and AMD and having it filed in the patient's records. [address] [facility] [address].    paging service [phone])

## 2020-03-09 NOTE — PROGRESS NOTES
Spiritual Care Partner Volunteer visited patient in [address] [place] [address] at [facility] on 03/09/2020.   Documented by:  Mary Grimm, [facility], Spiritual Care Intern

## 2020-03-09 NOTE — PROGRESS NOTES
Problem: Self Care Deficits Care Plan (Adult)  Goal: *Acute Goals and Plan of Care (Insert Text)  Description    FUNCTIONAL STATUS PRIOR TO ADMISSION: Patient was independent and active without use of DME. Reports caring for her 9 1and 3year old, was not driving, per chart ETOH ~half a case of beer/day. HOME SUPPORT: The patient lived with her children and was caring for them, reports her sister will be now staying with her. Occupational Therapy Goals  Initiated 3/9/2020  1. Patient will perform self-feeding with independence within 7 day(s). 2.  Patient will perform upper body dressing and lower body dressing with independence within 7 day(s). 3.  Patient will perform simple home management with independence within 7 day(s). 4.  Patient will perform toilet transfers with independence within 7 day(s). 5.  Patient will perform all aspects of toileting with independence within 7 day(s). 6.  Patient will increase right UE strength to 4+/5 grossly within 7 day(s). OCCUPATIONAL THERAPY EVALUATION  Patient: Cleopatra Quan (35 y.o. female)  Date: 3/9/2020  Primary Diagnosis: MS ([facility] sclerosis[facility] ([facility] [G35]        Precautions:   Fall    ASSESSMENT  Based on the objective data described below, the patient presents with decreased balance, activity tolerance and right UE strength as compared to her baseline, reporting right sided jaw pain since fall this past Friday. Will continue to follow, feel she may benefit from home health OT at discharge. Current Level of Function Impacting Discharge (ADLs/self-care): CGA to mod I basic ADLs and functional transfers    Functional Outcome Measure: The patient scored 60/100 on the Barthel Index outcome measure       Other factors to consider for discharge: has children to care for and her sister to stay with her and will be starting work ~1 week later     Patient will benefit from skilled therapy intervention to address the above noted impairments. PLAN :  Recommendations and Planned Interventions: self care training, functional mobility training, therapeutic exercise, balance training, therapeutic activities, endurance activities, patient education, home safety training, and family training/education    Frequency/Duration: Patient will be followed by occupational therapy 3 times a week to address goals. Recommendation for discharge: (in order for the patient to meet his/her long term goals)  Occupational therapy at least 2 days/week in the home AND ensure assist and/or supervision for safety with IADL's and functional mobility     This discharge recommendation:  Has not yet been discussed the attending provider and/or case management    IF patient discharges home will need the following DME: recommend grab bar in shower and hand held shower       SUBJECTIVE:   Patient stated I'm weaker on this side.     OBJECTIVE DATA SUMMARY:   HISTORY:   Past Medical History:   Diagnosis Date    Anemia     Graves disease     Multiple sclerosis ([facility]      Past Surgical History:   Procedure Laterality Date    HX  SECTION      x4 occurences    HX FREE SKIN GRAFT         Expanded or extensive additional review of patient history:     Home Situation  Home Environment: Apartment  One/Two Story Residence: Two story  Living Alone: No(4 kids)  Support Systems: Family member(s)(sister)  Patient Expects to be Discharged to[de-identified] Apartment  Current DME Used/Available at Home: [employer](in [place] with mom)  Tub or Shower Type: Tub/Shower combination    Hand dominance: Right    EXAMINATION OF PERFORMANCE DEFICITS:  Cognitive/Behavioral Status:  Neurologic State: Alert  Orientation Level: Oriented X4  Cognition: Follows commands; Appropriate safety awareness; Appropriate for age attention/concentration  Perception: Appears intact  Perseveration: No perseveration noted  Safety/Judgement: Awareness of environment; Fall prevention;Home safety; Insight into deficits    Skin: noted bruise right UE lateral shoulder    Edema: none noted    Hearing: Auditory  Auditory Impairment: None    Vision/Perceptual:                                     Range of Motion:  AROM: Within functional limits                         Strength:  Strength: Generally decreased, functional(reports right UE weaker than baseline)                Coordination:  Coordination: Generally decreased, functional  Fine Motor Skills-Upper: Left Intact; Right Intact    Gross Motor Skills-Upper: Left Intact; Right Intact    Tone & Sensation:  Tone: Normal  Sensation: Intact                      Balance:  Sitting: Intact; Without support  Standing: Impaired; Without support  Standing - Static: Good; Unsupported  Standing - Dynamic : Fair    Functional Mobility and Transfers for ADLs:  Bed Mobility:  Rolling: Modified independent  Supine to Sit: Modified independent  Sit to Supine: Modified independent  Scooting: Modified independent    Transfers:  Sit to Stand: Modified independent  Stand to Sit: Modified independent  Bed to Chair: Stand-by assistance  Bathroom Mobility: Stand-by assistance;Contact guard assistance  Toilet Transfer : Contact guard assistance; Adaptive equipment    ADL Assessment:  Feeding: Additional time;Supervision;Setup    Oral Facial Hygiene/Grooming: Modified Independent         Upper Body Dressing: Supervision;Setup    Lower Body Dressing: Contact guard assistance; Other (comment)(educated on home safety)    Toileting: Stand by assistance                ADL Intervention and task modifications:     Educated on role of OT, fall prevention, benefit of grab bar in tub/shower and optimal placement, educated on use of handheld shower to increase safety, instructed to call for assist with toilet transfers, demonstrated need for increased assist with toilet transfer due to low height, place bedside commode over toilet to increase independence and verbalized closer to height of hers at home     Patient instructed and indicated understanding the benefits of maintaining activity tolerance, functional mobility, and independence with self care tasks during acute stay  to ensure safe return home and to baseline. Encouraged patient to increase frequency and duration OOB, be out of bed for all meals, perform daily ADLs (as approved by RN/MD regarding bathing etc), and performing functional mobility to/from bathroom. Cognitive Retraining  Safety/Judgement: Awareness of environment; Fall prevention;Home safety; Insight into deficits    Functional Measure:  Barthel Index:    Bathin  Bladder: 10  Bowels: 10  Groomin  Dressin  Feedin  Mobility: 10  Stairs: 0  Toilet Use: 5  Transfer (Bed to Chair and Back): 10  Total: 60/100        The Barthel ADL Index: Guidelines  1. The index should be used as a record of what a patient does, not as a record of what a patient could do. 2. The main aim is to establish degree of independence from any help, physical or verbal, however minor and for whatever reason. 3. The need for supervision renders the patient not independent. 4. A patient's performance should be established using the best available evidence. Asking the patient, friends/relatives and nurses are the usual sources, but direct observation and common sense are also important. However direct testing is not needed. 5. Usually the patient's performance over the preceding 24-48 hours is important, but occasionally longer periods will be relevant. 6. Middle categories imply that the patient supplies over 50 per cent of the effort. 7. Use of aids to be independent is allowed. Nikolay Cano., Barthel, D.W. (7894). Functional evaluation: the Barthel Index. [facility] (14)2. ROMAIN Avalos, Amairani Esparza., [facility] [place], [address] [place], [place] [address] (). Measuring the change indisability after inpatient rehabilitation; comparison of the responsiveness of the Barthel Index and Functional Hillister Measure.  Journal of Neurology, Neurosurgery, and Psychiatry, 664), 758-000. ADIS Meng, HOLDEN Richardson, & Yves Gonzales M.A. (2004.) Assessment of post-stroke quality of life in cost-effectiveness studies: The usefulness of the Barthel Index and the EuroQoL-5D. Quality of Life Research, 15, 522-30         Occupational Therapy Evaluation Charge Determination   History Examination Decision-Making   LOW Complexity : Brief history review  MEDIUM Complexity : 3-5 performance deficits relating to physical, cognitive , or psychosocial skils that result in activity limitations and / or participation restrictions MEDIUM Complexity : Patient may present with comorbidities that affect occupational performnce. Miniml to moderate modification of tasks or assistance (eg, physical or verbal ) with assesment(s) is necessary to enable patient to complete evaluation       Based on the above components, the patient evaluation is determined to be of the following complexity level: LOW   Pain Rating:  No complaint     Activity Tolerance:   Good  Please refer to the flowsheet for vital signs taken during this treatment. After treatment patient left in no apparent distress:    Sitting edge of bed, call bell in reach     COMMUNICATION/EDUCATION:   The patients plan of care was discussed with: Physical therapist and Registered nurse. Home safety education was provided and the patient/caregiver indicated understanding. and Patient/family have participated as able in goal setting and plan of care. This patients plan of care is appropriate for delegation to [facility].     Thank you for this referral.  Caity Sainz OTR/L  Time Calculation: 24 mins

## 2020-03-09 NOTE — PROGRESS NOTES
Hospitalist Progress Note    NAME: Hai Daly   :  1985   MRN:  [id number]   Room Number:  592/01  @ [facility]       Interim Hospital Summary: 29 y.o. female whom presented on 3/7/2020 with      Assessment / Plan:  Syncope possibly due to   MS exacerbation POA  Left eye visual loss, no light perception  Right eye visual impairment  no follow up with neurologist for about 2 years     - IV Solu-medrol 1gram daily x 3 doses. -SSI for expected hyperglycemia   - PT OT evaluation   - Neurologist consultation   - Fall precautions   - MRI Brain without contrast  - Outpatient follow up.        Hypothyroidism -pt has not had thyroid replacement for >2years; she reports that she was taking about 100mcg of syndthroid daily previously   Lab Results   Component Value Date/Time    TSH 43.78 (H) 2020 01:13 PM       - Started levothyroxine 50mcg/QAM        Asymptomatic bacteruria :   UA +, Urine Cx growing GNR. Patient does not have symptoms of UTI. However, given MS Exacerbation, will treat possible infection.     - Keflex BID for 5 days. Chronic use of ETOH -  -CIWA protocol   -M/V IV   - Thiamine and folate PO         Anemia -possibly from menorrhagia and chronic diseases   - Supportive care.      Tobacco abuse disorder -<1PPD for \"many years \"  - Nicotine patch 14mg/day PRN      Cocaine abuse      Body mass index is 28.81 kg/m². Subjective:     Chief Complaint / Reason for Physician Visit  Reports new onset visual loss in left eye that started last night. Right eye has blurry vision. Discussed with RN events overnight. Review of Systems:  No fevers, chills, appetite change, cough, sputum production, shortness of breath, dyspnea on exertion, nausea, vomitting, diarrhea, constipation, chest pain, leg edema, abdominal pain, joint pain, rash, itching. Tolerating diet. Objective:     VITALS:   Last 24hrs VS reviewed since prior progress note.  Most recent are:  Patient Vitals for the past 24 hrs:   Temp Pulse Resp BP SpO2   03/09/20 0802 97.1 °F (36.2 °C) 64 16 109/73 98 %   03/08/20 2000 98 °F (36.7 °C) 62 18 122/70 98 %   03/08/20 1609 97.7 °F (36.5 °C) 60 18 124/71 100 %     No intake or output data in the 24 hours ending 03/09/20 1003     PHYSICAL EXAM:  General: WD, WN. Alert, cooperative, no acute distress    EENT:  EOMI. Anicteric sclerae. MMM  Resp:  CTA bilaterally, no wheezing or rales. No accessory muscle use  CV:  Regular  rhythm,  normal S1/S2, no murmurs rubs gallops, No edema  GI:  Soft, Non distended, Non tender. +Bowel sounds  Neurologic:  Alert and oriented X 3, normal speech, Left eye no light perception in all fields, right eye able to do finger counting at 1 ft. Psych:   Good insight. Not anxious nor agitated  Skin:  No rashes. No jaundice    Reviewed most current lab test results and cultures  YES  Reviewed most current radiology test results   YES  Review and summation of old records today    NO  Reviewed patient's current orders and MAR    YES  PMH/SH reviewed - no change compared to H&P  ________________________________________________________________________  Care Plan discussed with:    Comments   Patient x    Family      RN x    Care Manager x    Consultant  x Dr. Mike Read team rounds were held today with , nursing, pharmacist and clinical coordinator. Patient's plan of care was discussed; medications were reviewed and discharge planning was addressed.      ________________________________________________________________________  Total NON critical care TIME:  35   Minutes    Total CRITICAL CARE TIME Spent:   Minutes non procedure based      Comments   >50% of visit spent in counseling and coordination of care     ________________________________________________________________________  Samara Mendoza MD     Procedures: see electronic medical records for all procedures/Xrays and details which were not copied into this note but were reviewed prior to creation of Plan. LABS:  I reviewed today's most current labs and imaging studies.   Pertinent labs include:  Recent Labs     03/08/20  0445 03/07/20  1313   WBC 3.7 4.5   HGB 8.1* 8.8*   HCT 26.0* 28.9*    249     Recent Labs     03/08/20  0445 03/07/20  1313    140   K 3.6 3.3*    104   CO2 25 30   * 91   BUN 12 11   CREA 1.08* 1.23*   CA 8.3* 8.5   MG 2.4  --    ALB  --  3.6   TBILI  --  0.4   SGOT  --  57*   ALT  --  37       Signed: Tierney Barger MD

## 2020-03-09 NOTE — CDMP QUERY
Pt admitted with syncope. Pt noted to have +ua/cx. If possible, please document in the progress notes and d/c summary if you are evaluating and / or treating any of the following: 
 
? Urinary Tract Infection (UTI) ? Bacteriuria 
? Other, please specify ? Clinically unable to determine The medical record reflects the following: 
  Risk Factors: etoh, cocaine Clinical Indicators:ua: 4 bact, mod epith, mod le, pos nit, wbc 5-10, cx: gnr 
  Treatment: ivfs, cx Thanks, Deborah Coon Rn/CDI [phone]

## 2020-03-10 ENCOUNTER — APPOINTMENT (OUTPATIENT)
Dept: MRI IMAGING | Age: 35
DRG: 043 | End: 2020-03-10
Attending: STUDENT IN AN ORGANIZED HEALTH CARE EDUCATION/TRAINING PROGRAM
Payer: MEDICAID

## 2020-03-10 LAB
ALBUMIN SERPL ELPH-MCNC: 3.6 G/DL (ref 2.9–4.4)
ALBUMIN/GLOB SERPL: 1.2 {RATIO} (ref 0.7–1.7)
ALPHA1 GLOB SERPL ELPH-MCNC: 0.2 G/DL (ref 0–0.4)
ALPHA2 GLOB SERPL ELPH-MCNC: 0.7 G/DL (ref 0.4–1)
B-GLOBULIN SERPL ELPH-MCNC: 0.9 G/DL (ref 0.7–1.3)
GAMMA GLOB SERPL ELPH-MCNC: 1.2 G/DL (ref 0.4–1.8)
GLOBULIN SER CALC-MCNC: 3 G/DL (ref 2.2–3.9)
GLUCOSE BLD STRIP.AUTO-MCNC: 117 MG/DL (ref 65–100)
GLUCOSE BLD STRIP.AUTO-MCNC: 121 MG/DL (ref 65–100)
GLUCOSE BLD STRIP.AUTO-MCNC: 125 MG/DL (ref 65–100)
GLUCOSE BLD STRIP.AUTO-MCNC: 184 MG/DL (ref 65–100)
M PROTEIN SERPL ELPH-MCNC: NORMAL G/DL
PROT SERPL-MCNC: 6.6 G/DL (ref 6–8.5)
SERVICE CMNT-IMP: ABNORMAL

## 2020-03-10 PROCEDURE — A9575 INJ GADOTERATE MEGLUMI 0.1ML: HCPCS | Performed by: STUDENT IN AN ORGANIZED HEALTH CARE EDUCATION/TRAINING PROGRAM

## 2020-03-10 PROCEDURE — 72157 MRI CHEST SPINE W/O & W/DYE: CPT

## 2020-03-10 PROCEDURE — 74011250637 HC RX REV CODE- 250/637: Performed by: INTERNAL MEDICINE

## 2020-03-10 PROCEDURE — 74011250636 HC RX REV CODE- 250/636: Performed by: INTERNAL MEDICINE

## 2020-03-10 PROCEDURE — 74011250636 HC RX REV CODE- 250/636: Performed by: PSYCHIATRY & NEUROLOGY

## 2020-03-10 PROCEDURE — 82962 GLUCOSE BLOOD TEST: CPT

## 2020-03-10 PROCEDURE — 97116 GAIT TRAINING THERAPY: CPT | Performed by: PHYSICAL THERAPIST

## 2020-03-10 PROCEDURE — 94760 N-INVAS EAR/PLS OXIMETRY 1: CPT

## 2020-03-10 PROCEDURE — 74011250637 HC RX REV CODE- 250/637: Performed by: PSYCHIATRY & NEUROLOGY

## 2020-03-10 PROCEDURE — 74011250636 HC RX REV CODE- 250/636: Performed by: STUDENT IN AN ORGANIZED HEALTH CARE EDUCATION/TRAINING PROGRAM

## 2020-03-10 PROCEDURE — 65270000032 HC RM SEMIPRIVATE

## 2020-03-10 PROCEDURE — 74011250637 HC RX REV CODE- 250/637: Performed by: STUDENT IN AN ORGANIZED HEALTH CARE EDUCATION/TRAINING PROGRAM

## 2020-03-10 PROCEDURE — 74011000258 HC RX REV CODE- 258: Performed by: PSYCHIATRY & NEUROLOGY

## 2020-03-10 RX ORDER — ERGOCALCIFEROL 1.25 MG/1
50000 CAPSULE ORAL
Status: DISCONTINUED | OUTPATIENT
Start: 2020-03-10 | End: 2020-03-11 | Stop reason: HOSPADM

## 2020-03-10 RX ORDER — GADOTERATE MEGLUMINE 376.9 MG/ML
14 INJECTION INTRAVENOUS
Status: COMPLETED | OUTPATIENT
Start: 2020-03-10 | End: 2020-03-10

## 2020-03-10 RX ADMIN — ERGOCALCIFEROL 50000 UNITS: 1.25 CAPSULE ORAL at 16:49

## 2020-03-10 RX ADMIN — SODIUM CHLORIDE 1000 MG: 900 INJECTION, SOLUTION INTRAVENOUS at 14:56

## 2020-03-10 RX ADMIN — CEPHALEXIN 500 MG: 250 CAPSULE ORAL at 09:11

## 2020-03-10 RX ADMIN — HEPARIN SODIUM 5000 UNITS: 5000 INJECTION INTRAVENOUS; SUBCUTANEOUS at 22:38

## 2020-03-10 RX ADMIN — CEPHALEXIN 500 MG: 250 CAPSULE ORAL at 16:50

## 2020-03-10 RX ADMIN — GADOTERATE MEGLUMINE 14 ML: 376.9 INJECTION INTRAVENOUS at 11:16

## 2020-03-10 RX ADMIN — TOPIRAMATE 25 MG: 25 TABLET ORAL at 22:38

## 2020-03-10 RX ADMIN — HEPARIN SODIUM 5000 UNITS: 5000 INJECTION INTRAVENOUS; SUBCUTANEOUS at 14:39

## 2020-03-10 RX ADMIN — LEVOTHYROXINE SODIUM 50 MCG: 50 TABLET ORAL at 09:11

## 2020-03-10 RX ADMIN — Medication 100 MG: at 09:11

## 2020-03-10 RX ADMIN — SUMATRIPTAN SUCCINATE 100 MG: 25 TABLET ORAL at 09:17

## 2020-03-10 RX ADMIN — HYDROCODONE BITARTRATE AND ACETAMINOPHEN 1 TABLET: 5; 325 TABLET ORAL at 14:44

## 2020-03-10 RX ADMIN — FOLIC ACID 1 MG: 1 TABLET ORAL at 09:11

## 2020-03-10 RX ADMIN — Medication 10 ML: at 22:39

## 2020-03-10 NOTE — PROGRESS NOTES
Problem: Mobility Impaired (Adult and Pediatric)  Goal: *Acute Goals and Plan of Care (Insert Text)  Description  FUNCTIONAL STATUS PRIOR TO ADMISSION: Patient was independent and active without use of DME.    HOME SUPPORT PRIOR TO ADMISSION: The patient lived with her 3 children but did not require assist. Her sister and niece will be moving in with her. Physical Therapy Goals  Initiated 3/8/2020  1. Patient will transfer from bed to chair and chair to bed with independence using the least restrictive device within 7 day(s). 2.  Patient will perform sit to stand with independence within 7 day(s). 3.  Patient will ambulate with supervision/set-up for 200 feet with the least restrictive device within 7 day(s). Outcome: Progressing Towards Goal     PHYSICAL THERAPY TREATMENT  Patient: Brent Carty (25 y.o. female)  Date: 3/10/2020  Diagnosis: MS (multiple sclerosis) ([facility] [G35]   Multiple sclerosis exacerbation ([facility]       Precautions: Fall  Chart, physical therapy assessment, plan of care and goals were reviewed. ASSESSMENT  Patient continues with skilled PT services and is progressing towards goals. Patient reports improvement in sensation in BLEs, but now reports blindness in left eye (reports doctor and RN are aware). Patient modified independent with bed mobility and transfers. Patient ambulated a total of 220 feet with contact guard assistance. One seated rest break after 110 feet. Patient occasionally reaching for rail in hallway for balance. No loss of balance noted with ambulation today. Patient reports her sister will be staying with her and her children. Will benefit from home health PT at discharge. Other factors to consider for discharge: MS         PLAN :  Patient continues to benefit from skilled intervention to address the above impairments. Continue treatment per established plan of care. to address goals.     Recommendation for discharge: (in order for the patient to meet his/her long term goals)  Physical therapy at least 2 days/week in the home AND ensure assist and/or supervision for safety with balance     IF patient discharges home will need the following DME: none       SUBJECTIVE:   Patient stated I can't see out of my left eye today.     OBJECTIVE DATA SUMMARY:   Critical Behavior:  Neurologic State: Alert, Appropriate for age  Orientation Level: Oriented X4  Cognition: Appropriate for age attention/concentration, Appropriate safety awareness    Safety/Judgement: Awareness of environment, Fall prevention, Home safety, Insight into deficits    Functional Mobility Training:  Bed Mobility:     Supine to Sit: Modified independent  Sit to Supine: Modified independent  Scooting: Modified independent        Transfers:  Sit to Stand: Modified independent  Stand to Sit: Modified independent    Balance:  Sitting: Intact  Standing: Impaired; Without support  Standing - Static: Good  Standing - Dynamic : Fair    Ambulation/Gait Training:  Distance (ft): 220 Feet (ft)(110 feet x2)  Assistive Device: Gait belt  Ambulation - Level of Assistance: Contact guard assistance  Gait Abnormalities: Decreased step clearance  Base of Support: Widened  Speed/Erinn: Pace decreased (<100 feet/min)  Step Length: Right shortened;Left shortened    Pain Rating:  Generalized achy feeling; 2/10    Activity Tolerance:   Fair  Please refer to the flowsheet for vital signs taken during this treatment. After treatment patient left in no apparent distress:   Supine in bed and Call bell within reach    COMMUNICATION/COLLABORATION:   The patients plan of care was discussed with: Registered nurse.      Chepe Sharma, PT   Time Calculation: 18 mins

## 2020-03-10 NOTE — PROGRESS NOTES
Problem: Falls - Risk of  Goal: *Absence of Falls  Description  Document Darlene Valdes Fall Risk and appropriate interventions in the flowsheet.   Outcome: Progressing Towards Goal  Note: Fall Risk Interventions:            Medication Interventions: Teach patient to arise slowly         History of Falls Interventions: Door open when patient unattended         Problem: Patient Education: Go to Patient Education Activity  Goal: Patient/Family Education  Outcome: Progressing Towards Goal

## 2020-03-10 NOTE — PROGRESS NOTES
Neurology Progress Note    NAME:  Su Ramirez   :   1985   MRN:   [id number]     Date/Time:  3/9/2020 5:50 PM  Subjective:   Patient says she could not see out of the left eye  Headache  Weakness of the lower extremity    Review of Systems:  Y  N       Y  N  [] [x]  Fever/chills                                               [] [x]  Chest Pain  [] [x]  Cough                                                       [] [x]  Diarrhea   [] [x]  Sputum                                                     [] [x]  Constipation  [] [x]  SOB/HOLDER                                                [] [x]  Nausea/Vomit  [] [x]  Abd Pain                                                    [x] []  Tolerating PT  [] [x]  Dysuria                                                      [x] []  Tolerating Diet     []Unable to obtain  ROS due to  []mental status change  []sedated   []intubated    Medications reviewed:  Current Facility-Administered Medications   Medication Dose Route Frequency    methylPREDNISolone ((Solu-MEDROL) 1,000 mg in 0.9% sodium chloride (MBP/ADV) 100 mL  1 g IntraVENous Q24H    cephALEXin (KEFLEX) capsule 500 mg  500 mg Oral BID    ergocalciferol capsule 50,000 Units  50,000 Units Oral EVERY TU & TH    SUMAtriptan (IMITREX) tablet 100 mg  100 mg Oral PRN    topiramate (TOPAMAX) tablet 25 mg  25 mg Oral QHS    sodium chloride (NS) flush 5-40 mL  5-40 mL IntraVENous Q8H    sodium chloride (NS) flush 5-40 mL  5-40 mL IntraVENous PRN    acetaminophen (TYLENOL) tablet 500 mg  500 mg Oral Q4H PRN    naloxone (NARCAN) injection 0.4 mg  0.4 mg IntraVENous PRN    ondansetron (ZOFRAN) injection 2 mg  2 mg IntraVENous Q6H PRN    bisacodyL (DULCOLAX) tablet 5 mg  5 mg Oral DAILY PRN    nicotine (NICODERM CQ) 7 mg/24 hr patch 1 Patch  1 Patch TransDERmal DAILY PRN    heparin (porcine) injection 5,000 Units  5,000 Units SubCUTAneous Q8H    levothyroxine (SYNTHROID) tablet 50 mcg  50 mcg Oral ACB    insulin lispro (HUMALOG) injection   SubCUTAneous AC&HS    glucose chewable tablet 16 g  4 Tab Oral PRN    glucagon (GLUCAGEN) injection 1 mg  1 mg IntraMUSCular PRN    dextrose 10% infusion 0-250 mL  0-250 mL IntraVENous PRN    LORazepam (ATIVAN) tablet 2 mg  2 mg Oral Q1H PRN    LORazepam (ATIVAN) tablet 4 mg  4 mg Oral Q1H PRN    LORazepam (ATIVAN) injection 1 mg  1 mg IntraVENous Q1H PRN    LORazepam (ATIVAN) injection 2 mg  2 mg IntraVENous D1Y PRN    folic acid (FOLVITE) tablet 1 mg  1 mg Oral DAILY    thiamine HCL (B-1) tablet 100 mg  100 mg Oral DAILY    HYDROcodone-acetaminophen (NORCO) 5-325 mg per tablet 1 Tab  1 Tab Oral Q6H PRN        Objective:   Vitals:  Visit Vitals  /78   Pulse 61   Temp 97.8 °F (36.6 °C)   Resp 18   Ht 5' 0.5\" (1.537 m)   Wt 150 lb (68 kg)   LMP 2020   SpO2 100%   BMI 28.81 kg/m²     Temp (24hrs), Av °F (36.7 °C), Min:97.8 °F (36.6 °C), Max:98.2 °F (36.8 °C)      O2 Device: Room air      PHYSICAL EXAM:  General:    Alert, cooperative, no distress, appears stated age. Head:   Normocephalic, without obvious abnormality, atraumatic. Eyes:   Conjunctivae/corneas clear. PERRLA  Nose:  Nares normal. No drainage or sinus tenderness. Throat:    Lips, mucosa, and tongue normal.  No Thrush  Neck:  Supple, symmetrical,  no adenopathy, thyroid: non tender    no carotid bruit and no JVD. Back:    Symmetric,  No CVA tenderness. Lungs:   Clear to auscultation bilaterally. No Wheezing or Rhonchi. No rales. Chest wall:  No tenderness or deformity. No Accessory muscle use. Heart:   Regular rate and rhythm,  no murmur, rub or gallop. Abdomen:   Soft, non-tender. Not distended. Bowel sounds normal. No masses  Extremities: Extremities normal, atraumatic, No cyanosis. No edema. No clubbing  Skin:     Texture, turgor normal. No rashes or lesions. Not Jaundiced  Lymph nodes: Cervical, supraclavicular normal.  Psych:  Good insight. Depressed. Anxious.     NEUROLOGICAL EXAM:  Appearance: The patient is well developed, well nourished, provides a coherent history and is in no acute distress. Mental Status: Oriented to time, place and person. Mood and affect appropriate. Cranial Nerves:   Intact visual fields. Fundi are benign. HERVE, EOM's full, no nystagmus, no ptosis. Facial sensation is normal. Corneal reflexes are intact. Facial movement is symmetric. Hearing is normal bilaterally. Palate is midline with normal sternocleidomastoid and trapezius muscles are normal. Tongue is midline. Motor:  5-/5 strength in left upper extremity and 4+/5 left lower extremity proximal and distal muscles. Normal bulk and tone. No fasciculations. Reflexes:   Deep tendon reflexes 2+/4 and symmetrical.   Sensory:    Dysesthesia to touch, pinprick and vibration. Gait:   Not assessed. Tremor:   No tremor noted. Cerebellar:  No cerebellar signs present. Neurovascular:  Normal heart sounds and regular rhythm, peripheral pulses intact, and no carotid bruits.          Lab Data Reviewed:    Recent Results (from the past 24 hour(s))   GLUCOSE, POC    Collection Time: 03/09/20 11:23 AM   Result Value Ref Range    Glucose (POC) 124 (H) 65 - 100 mg/dL    Performed by Noe Muñoz, POC    Collection Time: 03/09/20  4:40 PM   Result Value Ref Range    Glucose (POC) 148 (H) 65 - 100 mg/dL    Performed by Media Sniff    GLUCOSE, POC    Collection Time: 03/09/20 10:41 PM   Result Value Ref Range    Glucose (POC) 161 (H) 65 - 100 mg/dL    Performed by Mejai Love    GLUCOSE, POC    Collection Time: 03/10/20  7:36 AM   Result Value Ref Range    Glucose (POC) 125 (H) 65 - 100 mg/dL    Performed by Verena Whitman        Assesment  Principal Problem:    Multiple sclerosis exacerbation ([facility] [address] (3/7/2020)    Active Problems:    MS (multiple sclerosis) ([facility] [address] (3/7/2020)      Hypothyroidism due to Hashimoto's thyroiditis (3/7/2020)      Fall at home (3/7/2020)      Anemia (3/7/2020) Tobacco abuse disorder (3/7/2020)      Overview: <1PPD       Chronic alcohol use (3/7/2020)      Overview: Half a case of beer  Intermittently       Intermittent liqour when others are drinking  - socially       Cocaine use (3/8/2020)      Overview: UDS +ve 03/07/20     Migraine headache  Left optic neuritis  Rule out neuromyelitis optica  Vitamin D deficiency  ___________________________________________________  PLAN:    1. MRI of the thoracic spine  2. Diamox 500 mg IV x1 dose  3.  Vitamin D 2 50,000 units 2 times a week        ___________________________________________________     :    ___________________________________________________    Attending Physician: Alma James MD

## 2020-03-10 NOTE — PROGRESS NOTES
Hospitalist Progress Note    NAME: Becky Ellis   :  1985   MRN:  [id number]   Room Number:  468/84  @ [address] [facility]       Interim Hospital Summary: 29 y.o. female whom presented on 3/7/2020 with      Assessment / Plan:  Syncope possibly due to   MS exacerbation POA  Left eye visual loss  Right eye visual impairment  Vitamin D deficiency  no follow up with neurologist for about 2 years. MRI Brain and cervical spine reviewed and discussed with neurologist. show Small enhancing active demyelinating plaque in the left ventral beba. Multiple other confluent supratentorial white matter lesions with an appearance and pattern consistent with demyelinating disease. Normal MRI of the cervical spine. There is no evidence of cervical cord demyelinating disease burden. Vitamin D level low 13.7. Patient remains at significant risk of loss of neurological function and visual loss.      - IV Solu-medrol 1gram daily x 3 doses. - SSI for expected hyperglycemia. - PT OT evaluation.   - Vitamin D repletion. 50,000 units Q7D followed by maintenance dosing.  - Neurologist following.   - Fall precautions.   - Outpatient follow up.         Hypothyroidism -pt has not had thyroid replacement for >2years; she reports that she was taking about 100mcg of syndthroid daily previously         Lab Results   Component Value Date/Time     TSH 43.78 (H) 2020 01:13 PM         - Started levothyroxine 50mcg/QAM.        Asymptomatic bacteruria :   UA +, Urine Cx growing GNR. Patient does not have symptoms of UTI.  However, given MS Exacerbation, will treat possible infection.      - Keflex BID for 5 days.         Chronic use of ETOH -  -CIWA protocol   -M/V IV   - Thiamine and folate PO         Anemia -possibly from menorrhagia and chronic diseases   - Supportive care.      Tobacco abuse disorder -<1PPD for \"many years \"  - Nicotine patch 14mg/day PRN      Cocaine abuse      Body mass index is 28.81 kg/m².  Subjective:     Chief Complaint / Reason for Physician Visit  \"blurry vision getting better\". Discussed with RN events overnight. Review of Systems:  No fevers, chills, appetite change, cough, sputum production, shortness of breath, dyspnea on exertion, nausea, vomitting, diarrhea, constipation, chest pain, leg edema, abdominal pain, joint pain, rash, itching. Tolerating PT/OT. Tolerating diet. Objective:     VITALS:   Last 24hrs VS reviewed since prior progress note. Most recent are:  Patient Vitals for the past 24 hrs:   Temp Pulse Resp BP SpO2   03/10/20 1146 97.8 °F (36.6 °C) 61 18 121/87    03/10/20 0910 97.8 °F (36.6 °C) 61 18 115/78    03/10/20 0820     100 %   03/09/20 2044 98.2 °F (36.8 °C) 68 16 119/60 100 %   03/09/20 1806     99 %   03/09/20 1637 98 °F (36.7 °C) 62 18 101/62 99 %     No intake or output data in the 24 hours ending 03/10/20 1218     PHYSICAL EXAM:  General: WD, WN. Alert, cooperative, no acute distress    EENT:  EOMI. Anicteric sclerae. MMM  Resp:  CTA bilaterally, no wheezing or rales. No accessory muscle use  CV:  Regular  rhythm,  normal S1/S2, no murmurs rubs gallops, No edema  GI:  Soft, Non distended, Non tender. +Bowel sounds  Neurologic:  Alert and oriented X 3, normal speech, has some light perception. Psych:   Good insight. Not anxious nor agitated  Skin:  No rashes. No jaundice    Reviewed most current lab test results and cultures  YES  Reviewed most current radiology test results   YES  Review and summation of old records today    NO  Reviewed patient's current orders and MAR    YES  PMH/SH reviewed - no change compared to H&P  ________________________________________________________________________  Care Plan discussed with:    Comments   Patient x    Family      RN x    Care Manager x    Consultant                        Multidiciplinary team rounds were held today with , nursing, pharmacist and clinical coordinator.   Patient's plan of care was discussed; medications were reviewed and discharge planning was addressed. ________________________________________________________________________  Total NON critical care TIME:  35   Minutes    Total CRITICAL CARE TIME Spent:   Minutes non procedure based      Comments   >50% of visit spent in counseling and coordination of care     ________________________________________________________________________  Loy Leventhal, MD     Procedures: see electronic medical records for all procedures/Xrays and details which were not copied into this note but were reviewed prior to creation of Plan. LABS:  I reviewed today's most current labs and imaging studies.   Pertinent labs include:  Recent Labs     03/08/20  0445 03/07/20  1313   WBC 3.7 4.5   HGB 8.1* 8.8*   HCT 26.0* 28.9*    249     Recent Labs     03/08/20  0445 03/07/20  1313    140   K 3.6 3.3*    104   CO2 25 30   * 91   BUN 12 11   CREA 1.08* 1.23*   CA 8.3* 8.5   MG 2.4  --    ALB  --  3.6   TBILI  --  0.4   SGOT  --  57*   ALT  --  37       Signed: Loy Leventhal, MD

## 2020-03-11 VITALS
BODY MASS INDEX: 28.32 KG/M2 | TEMPERATURE: 97.6 F | SYSTOLIC BLOOD PRESSURE: 121 MMHG | HEART RATE: 58 BPM | DIASTOLIC BLOOD PRESSURE: 80 MMHG | HEIGHT: 61 IN | OXYGEN SATURATION: 100 % | RESPIRATION RATE: 17 BRPM | WEIGHT: 150 LBS

## 2020-03-11 LAB
BACTERIA SPEC CULT: ABNORMAL
CC UR VC: ABNORMAL
GLUCOSE BLD STRIP.AUTO-MCNC: 116 MG/DL (ref 65–100)
GLUCOSE BLD STRIP.AUTO-MCNC: 128 MG/DL (ref 65–100)
SERVICE CMNT-IMP: ABNORMAL

## 2020-03-11 PROCEDURE — 82962 GLUCOSE BLOOD TEST: CPT

## 2020-03-11 PROCEDURE — 74011250636 HC RX REV CODE- 250/636: Performed by: PSYCHIATRY & NEUROLOGY

## 2020-03-11 PROCEDURE — 74011250637 HC RX REV CODE- 250/637: Performed by: INTERNAL MEDICINE

## 2020-03-11 PROCEDURE — 97535 SELF CARE MNGMENT TRAINING: CPT

## 2020-03-11 PROCEDURE — 74011250637 HC RX REV CODE- 250/637: Performed by: STUDENT IN AN ORGANIZED HEALTH CARE EDUCATION/TRAINING PROGRAM

## 2020-03-11 PROCEDURE — 74011250636 HC RX REV CODE- 250/636: Performed by: INTERNAL MEDICINE

## 2020-03-11 PROCEDURE — 74011000258 HC RX REV CODE- 258: Performed by: PSYCHIATRY & NEUROLOGY

## 2020-03-11 RX ORDER — CEPHALEXIN 500 MG/1
500 CAPSULE ORAL 2 TIMES DAILY
Qty: 6 CAP | Refills: 0 | Status: SHIPPED | OUTPATIENT
Start: 2020-03-11 | End: 2022-04-27

## 2020-03-11 RX ORDER — PREDNISONE 10 MG/1
TABLET ORAL
Qty: 84 TAB | Refills: 0 | Status: SHIPPED | OUTPATIENT
Start: 2020-03-11 | End: 2020-04-01

## 2020-03-11 RX ORDER — TOPIRAMATE 25 MG/1
25 TABLET ORAL
Qty: 30 TAB | Refills: 0 | Status: SHIPPED | OUTPATIENT
Start: 2020-03-11 | End: 2022-04-27 | Stop reason: SDUPTHER

## 2020-03-11 RX ORDER — NICOTINE 7MG/24HR
1 PATCH, TRANSDERMAL 24 HOURS TRANSDERMAL EVERY 24 HOURS
Qty: 30 PATCH | Refills: 0 | Status: SHIPPED | OUTPATIENT
Start: 2020-03-11 | End: 2020-04-10

## 2020-03-11 RX ORDER — LANOLIN ALCOHOL/MO/W.PET/CERES
100 CREAM (GRAM) TOPICAL DAILY
Qty: 30 TAB | Refills: 0 | Status: SHIPPED | OUTPATIENT
Start: 2020-03-12

## 2020-03-11 RX ORDER — LEVOTHYROXINE SODIUM 50 UG/1
50 TABLET ORAL
Qty: 30 TAB | Refills: 0 | Status: SHIPPED | OUTPATIENT
Start: 2020-03-12

## 2020-03-11 RX ORDER — ERGOCALCIFEROL 1.25 MG/1
50000 CAPSULE ORAL
Qty: 7 CAP | Refills: 0 | Status: SHIPPED | OUTPATIENT
Start: 2020-03-17

## 2020-03-11 RX ORDER — FOLIC ACID 1 MG/1
1 TABLET ORAL DAILY
Qty: 30 TAB | Refills: 0 | Status: SHIPPED | OUTPATIENT
Start: 2020-03-12

## 2020-03-11 RX ADMIN — SODIUM CHLORIDE 1000 MG: 900 INJECTION, SOLUTION INTRAVENOUS at 09:34

## 2020-03-11 RX ADMIN — FOLIC ACID 1 MG: 1 TABLET ORAL at 08:49

## 2020-03-11 RX ADMIN — LEVOTHYROXINE SODIUM 50 MCG: 50 TABLET ORAL at 08:49

## 2020-03-11 RX ADMIN — Medication 10 ML: at 05:25

## 2020-03-11 RX ADMIN — Medication 100 MG: at 08:49

## 2020-03-11 RX ADMIN — HEPARIN SODIUM 5000 UNITS: 5000 INJECTION INTRAVENOUS; SUBCUTANEOUS at 05:25

## 2020-03-11 RX ADMIN — CEPHALEXIN 500 MG: 250 CAPSULE ORAL at 08:49

## 2020-03-11 NOTE — PROGRESS NOTES
Problem: Self Care Deficits Care Plan (Adult)  Goal: *Acute Goals and Plan of Care (Insert Text)  Description:   FUNCTIONAL STATUS PRIOR TO ADMISSION: Patient was independent and active without use of DME. Reports caring for her 9 1and 3year old, was not driving, per chart ETOH ~half a case of beer/day. HOME SUPPORT: The patient lived with her children and was caring for them, reports her sister will be now staying with her. Occupational Therapy Goals  Initiated 3/9/2020  1. Patient will perform self-feeding with independence within 7 day(s). 2.  Patient will perform upper body dressing and lower body dressing with independence within 7 day(s). 3.  Patient will perform simple home management with independence within 7 day(s). 4.  Patient will perform toilet transfers with independence within 7 day(s). 5.  Patient will perform all aspects of toileting with independence within 7 day(s). 6.  Patient will increase right UE strength to 4+/5 grossly within 7 day(s). Outcome: Progressing Towards Goal  OCCUPATIONAL THERAPY TREATMENT  Patient: Kee Bueno (38 y.o. female)  Date: 3/11/2020  Diagnosis: MS (multiple sclerosis) ([facility] [address] [G35]   Multiple sclerosis exacerbation ([facility] [address]       Precautions: Fall  Chart, occupational therapy assessment, plan of care, and goals were reviewed. ASSESSMENT  Patient continues with skilled OT services and is progressing towards goals. Pt received supine in bed, agreeable to participate. Continues to report decreased vision L eye, but that it has improved from yesterday. Reported she had recently been to the restroom and washed up. Pt mod I for bed mobility and functional transfers in room, mildly unsteady, no LOB. Stood at sink for approx 5 minutes to perform grooming ADLs with SBA. She reports less LE weakness today in standing, demonstrated good awareness of pacing. Discussed energy conservation methods and fall prevention techniques for LB dressing and bathing. Returned to bed at end of session. Pt demonstrates good participation in session and is progressing towards goals. Continue to recommend [facility] therapy at discharge. Current Level of Function Impacting Discharge (ADLs): Mod I transfers, SBA-CGA for functional mobility and standing ADLs, independent-CGA for ADLs    Other factors to consider for discharge: Pt's sister will be staying with her at discharge. PLAN :  Patient continues to benefit from skilled intervention to address the above impairments. Continue treatment per established plan of care. to address goals. Recommend with staff: OOB to chair for all meals    Recommendation for discharge: (in order for the patient to meet his/her long term goals)  Occupational therapy at least 2 days/week in the home AND ensure assist and/or supervision for safety with IADLs and mobility    This discharge recommendation:  Has been made in collaboration with the attending provider and/or case management    IF patient discharges home will need the following DME:        SUBJECTIVE:   Patient stated I feel better today than yesterday.     OBJECTIVE DATA SUMMARY:   Cognitive/Behavioral Status:  Neurologic State: Alert  Orientation Level: Oriented X4  Cognition: Follows commands  Perception: Appears intact  Perseveration: No perseveration noted  Safety/Judgement: Awareness of environment; Fall prevention    Functional Mobility and Transfers for ADLs:  Bed Mobility:  Supine to Sit: Modified independent  Sit to Supine: Modified independent    Transfers:  Sit to Stand: Modified independent     Balance:  Sitting: Intact  Standing: Impaired; Without support  Standing - Static: Good  Standing - Dynamic : Fair    ADL Intervention:    Educated patient on energy conservation and strategies to maximize her quality of life and decrease her stress/anxiety. 1. Deep breathing  2.  Educated on pacing and making sure he/she takes short frequent breaks (e.g. In the shower wash the upper body, rest for 1 minute, then wash the lower body, etc)  3. Educated on using cooler water in the shower so as to not get fatigued from the heat  5. Educated on re-arranging his/her routine to allow for rest breaks in the morning routine  8. Educated on looking at the consequences of his/her actions before deciding he/she needs to take on a task (e.g not getting down on one's hands and knees to wash floors because it will take all of one's energy for the day and result in exhaustion). 8. Educated on DME used to help conserve energy, such as a shower seat, a stool or chair in the kitchen, and pushing or pulling items instead of carrying them    Grooming  Washing Face: Stand-by assistance(standing at sink)  Brushing Teeth: Stand-by assistance(standing at sink)       Lower Body Dressing Assistance  Socks: Supervision  Leg Crossed Method Used: Yes  Position Performed: Seated edge of bed         Cognitive Retraining  Safety/Judgement: Awareness of environment; Fall prevention    Pain:  0/10    Activity Tolerance:   Good  Please refer to the flowsheet for vital signs taken during this treatment. After treatment patient left in no apparent distress:   Supine in bed and Call bell within reach    COMMUNICATION/COLLABORATION:   The patients plan of care was discussed with: Physical therapist and Registered nurse.      Wan Ferrer OT  Time Calculation: 15 mins

## 2020-03-11 NOTE — PROGRESS NOTES
Continuation of Care-  IDT met to discuss plan of care. Patient going home today. Friend to provide transportation home.  8 medications from [facility]pharmacy #[id number]- [place], [place] - [address] [facility] [facility]    Next steps: Follow up    [address]     [id number]   8:00 AM - 12:00 Midnight     Please call for information and someone to talk to about substance abuse. Follow up with [facility] on 3/16/2020    UrvashiSal Rosmery    Your appointment time is 10:30am. [place] go and get information about substance abuse., Please keep this appointment, Bring ins card, picture id, and discharge papers, Please arrive 15 minutes early. Follow up with Lexy Umana MD on 3/18/2020    Specialty: [facility]    [address] [address]   [address] [facility] [address] [place] [address] [address]   [phone]    Your appointment time is 9am.  , Bring ins card, picture id, and discharge papers, Please keep this appointment, Please arrive 15 minutes early. Follow up with Fiona Kan MD on 3/20/2020    Specialty: Neurology    [address] [facility] [phone]    your appointment 3-20-20 @ 8:00AM needs to be 12-20 min early. bring insurance card and ID       Care Management Interventions  PCP Verified by CM:  Yes  Mode of Transport at Discharge: Self  Transition of Care Consult (CM Consult): Home Health, Discharge [address] [facility]: Yes  Current Support Network: Lives Alone(Patient's sister is staying with her. )  Confirm Follow Up Transport: Self  The Plan for Transition of Care is Related to the Following Treatment Goals : New PCP, Home luis therapy, neurology follow-up appointment  The Patient and/or Patient Representative was Provided with a Choice of Provider and Agrees with the Discharge Plan?: Yes  Name of the Patient Representative Who was Provided with a Choice of Provider and Agrees with the Discharge Plan: Patient  Freedom of Choice List was Provided with Basic Dialogue that Supports the Patient's Individualized Plan of Care/Goals, Treatment Preferences and Shares the Quality Data Associated with the Providers?: Yes  Discharge Location  Discharge Placement: Home with home health     ZOILA Grace/JESSE  [phone]

## 2020-03-11 NOTE — DISCHARGE SUMMARY
Hospitalist Discharge Summary     Patient ID:  Heidi Pereira  [id number]  29 y.o.  1985    PCP on record: Unknown, Provider    Admit date: 3/7/2020  Discharge date and time: 3/11/2020      Admission Diagnoses: MS (multiple sclerosis) ([facility] [address] Jania Richard    Discharge Diagnoses:    Principal Problem:    Multiple sclerosis exacerbation ([facility] [address] (3/7/2020)    Active Problems:    MS (multiple sclerosis) ([facility] [address] (3/7/2020)      Hypothyroidism due to Hashimoto's thyroiditis (3/7/2020)      Fall at home (3/7/2020)      Anemia (3/7/2020)      Tobacco abuse disorder (3/7/2020)      Overview: <1PPD       Chronic alcohol use (3/7/2020)      Overview: Half a case of beer  Intermittently       Intermittent liqour when others are drinking  - socially       Cocaine use (3/8/2020)      Overview: UDS +ve 03/07/20            Hospital Course:   Syncope possibly due to   MS exacerbation POA  Left eye visual loss  Right eye visual impairment  Vitamin D deficiency  no follow up with neurologist for about 2 years. MRI Brain and cervical spine reviewed and discussed with neurologist. show Small enhancing active demyelinating plaque in the left ventral beba. Multiple other confluent supratentorial white matter lesions with an appearance and pattern consistent with demyelinating disease. Normal MRI of the cervical spine. There is no evidence of cervical cord demyelinating disease burden. Vitamin D level low 13.7. Patient remains at significant risk of loss of neurological function and visual loss. IV Solu-medrol 1gram daily x 4 doses, discharged on prednisone. She had PT/OT evaluation. Vitamin D repletion. 50,000 units Q7D followed by maintenance dosing.   Neurology follow up.         Hypothyroidism -pt has not had thyroid replacement for >2years; she reports that she was taking about 100mcg of syndthroid daily previously             Lab Results   Component Value Date/Time     TSH 43.78 (H) 03/07/2020 01:13 PM   Continue levothyroxine 50mcg/QAM.        Asymptomatic bacteruria :   UA +, Urine Cx growing GNR. Patient does not have symptoms of UTI. However, given MS Exacerbation, will treat possible infection. Keflex BID for 5 days.         Chronic use of ETOH :  -M/V IV   -Thiamine and folate PO         Anemia -possibly from menorrhagia and chronic diseases   Supportive care.        Tobacco abuse disorder -<1PPD for \"many years \"  Nicotine patch 14mg/day PRN      Cocaine abuse   Patient was counseled extensively on the need to abstain from drug, its addictive tendencies, its deleterious effects on the brain, lungs as well as its financial sequelae    Body mass index is 28.81 kg/m².   Counseled on Lifestyle modifications, AHA Diet ,weight loss strategies. CONSULTATIONS:  IP CONSULT TO NEUROLOGY    Excerpted HPI from H&P of Isaiah Faye MD:  The pt reports that she has had diagnosis of MS for long time and she had been followed by [facility] neurologists. Due to family reasons she came to [place] for over two years. sonce then she has not had any follow up with neurologist or PCP   She has not taken any MS or thyroid medications. ______________________________________________________________________  DISCHARGE SUMMARY/HOSPITAL COURSE:  for full details see H&P, daily progress notes, labs, consult notes. Visit Vitals  /80 (BP 1 Location: Right arm, BP Patient Position: At rest)   Pulse (!) 58   Temp 97.6 °F (36.4 °C)   Resp 17   Ht 5' 0.5\" (1.537 m)   Wt 68 kg (150 lb)   SpO2 100%   BMI 28.81 kg/m²       Patient seen and examined by me on discharge day. Pertinent Findings:  Gen:    Not in distress  Chest: Clear lungs  CVS:   Regular rhythm. No edema  Abd:  Soft, not distended, not tender  Neuro:  Alert with good insight. Oriented to person, place, and time.  Some light perception in left eye.   _______________________________________________________________________  DISCHARGE MEDICATIONS:   Discharge Medication List as of 3/11/2020 12:03 PM      START taking these medications    Details   cephALEXin (KEFLEX) 500 mg capsule Take 1 Cap by mouth two (2) times a day. First dose 3/11 evening., Normal, Disp-6 Cap, R-0      topiramate (TOPAMAX) 25 mg tablet Take 1 Tab by mouth nightly., Normal, Disp-30 Tab, R-0      thiamine HCL (B-1) 100 mg tablet Take 1 Tab by mouth daily. , Normal, Disp-30 Tab, R-0      nicotine (NICODERM CQ) 7 mg/24 hr 1 Patch by TransDERmal route every twenty-four (24) hours for 30 days. , Normal, Disp-30 Patch, R-0      folic acid (FOLVITE) 1 mg tablet Take 1 Tab by mouth daily. , Normal, Disp-30 Tab, R-0      ergocalciferol (ERGOCALCIFEROL) 1,250 mcg (50,000 unit) capsule Take 1 Cap by mouth every seven (7) days. , Normal, Disp-7 Cap, R-0      predniSONE (DELTASONE) 10 mg tablet Take 70 mg by mouth daily (with breakfast) for 3 days, THEN 60 mg daily (with breakfast) for 3 days, THEN 50 mg daily (with breakfast) for 3 days, THEN 40 mg daily (with breakfast) for 3 days, THEN 30 mg daily (with breakfast) for 3 days, THEN 20 mg pepper y (with breakfast) for 3 days, THEN 10 mg daily (with breakfast) for 3 days. , Normal, Disp-84 Tab, R-0         CONTINUE these medications which have CHANGED    Details   levothyroxine (SYNTHROID) 50 mcg tablet Take 1 Tab by mouth Daily (before breakfast). , Normal, Disp-30 Tab, R-0         STOP taking these medications       interferon beta-1b (BETASERON) 0.3 mg injection Comments:   Reason for Stopping:         ibuprofen (MOTRIN) 400 mg tablet Comments:   Reason for Stopping:         traMADoL (ULTRAM) 50 mg tablet Comments:   Reason for Stopping:         cyclobenzaprine (FLEXERIL) 5 mg tablet Comments:   Reason for Stopping:               My Recommended Diet, Activity, Wound Care, and follow-up labs are listed in the patient's Discharge Insturctions which I have personally completed and reviewed. _______________________________________________________________________  DISPOSITION:     Home with Family: x   Home with HH/PT/OT/RN:    SNF/LTC:    CLARITZA:    OTHER:        Condition at Discharge:  Stable  _______________________________________________________________________  Follow up with:   PCP : Unknown, Provider  Follow-up Information     Follow up With Specialties Details Why Contact Info    Celia Cruz MD Neurology On 3/20/2020 your appointment 3-20-20 @ 8:00AM needs to be 12-20 min early. bring insurance card and ID  [address]  [address]      Amina Ham MD Family Practice On 3/18/2020 Your appointment time is 9am.  , Bring ins card, picture id, and discharge papers, Please keep this appointment, Please arrive 15 minutes early. [address] [place] [address] [address]  [phone]      [employer]  On 3/16/2020 Your appointment time is 10:30am.  Please go and get information about substance abuse., Please keep this appointment, Bring ins card, picture id, and discharge papers, Please arrive 15 minutes early. [address] [place] [id number]    [facility]   Please call for information and someone to talk to about substance abuse.  [phone]    [phone]  8:00 AM - 12:00 Midnight                Total time in minutes spent coordinating this discharge (includes going over instructions, follow-up, prescriptions, and preparing report for sign off to her PCP) :  25 minutes    Signed:  Merle Alvarez MD

## 2020-03-11 NOTE — PROGRESS NOTES
Neurology Progress Note    NAME:  Jassi Shine   :   1985   MRN:   [id number]     Date/Time:  3/10/2020 5:53PM  Subjective:   Headache improving  Cannot see out of left eye      Review of Systems:  Y  N       Y  N  [] [x]  Fever/chills                                               [] [x]  Chest Pain  [] [x]  Cough                                                       [] [x]  Diarrhea   [] [x]  Sputum                                                     [] [x]  Constipation  [] [x]  SOB/HOLDER                                                [] [x]  Nausea/Vomit  [] [x]  Abd Pain                                                    [x] []  Tolerating PT  [] [x]  Dysuria                                                      [x] []  Tolerating Diet        Medications reviewed:  Current Facility-Administered Medications   Medication Dose Route Frequency    ergocalciferol capsule 50,000 Units  50,000 Units Oral Q7D    cephALEXin (KEFLEX) capsule 500 mg  500 mg Oral BID    SUMAtriptan (IMITREX) tablet 100 mg  100 mg Oral PRN    topiramate (TOPAMAX) tablet 25 mg  25 mg Oral QHS    sodium chloride (NS) flush 5-40 mL  5-40 mL IntraVENous Q8H    sodium chloride (NS) flush 5-40 mL  5-40 mL IntraVENous PRN    acetaminophen (TYLENOL) tablet 500 mg  500 mg Oral Q4H PRN    naloxone (NARCAN) injection 0.4 mg  0.4 mg IntraVENous PRN    ondansetron (ZOFRAN) injection 2 mg  2 mg IntraVENous Q6H PRN    bisacodyL (DULCOLAX) tablet 5 mg  5 mg Oral DAILY PRN    nicotine (NICODERM CQ) 7 mg/24 hr patch 1 Patch  1 Patch TransDERmal DAILY PRN    heparin (porcine) injection 5,000 Units  5,000 Units SubCUTAneous Q8H    levothyroxine (SYNTHROID) tablet 50 mcg  50 mcg Oral ACB    insulin lispro (HUMALOG) injection   SubCUTAneous AC&HS    glucose chewable tablet 16 g  4 Tab Oral PRN    glucagon (GLUCAGEN) injection 1 mg  1 mg IntraMUSCular PRN    dextrose 10% infusion 0-250 mL  0-250 mL IntraVENous PRN    LORazepam (ATIVAN) tablet 2 mg  2 mg Oral Q1H PRN    LORazepam (ATIVAN) tablet 4 mg  4 mg Oral Q1H PRN    LORazepam (ATIVAN) injection 1 mg  1 mg IntraVENous Q1H PRN    LORazepam (ATIVAN) injection 2 mg  2 mg IntraVENous K7R PRN    folic acid (FOLVITE) tablet 1 mg  1 mg Oral DAILY    thiamine HCL (B-1) tablet 100 mg  100 mg Oral DAILY    HYDROcodone-acetaminophen (NORCO) 5-325 mg per tablet 1 Tab  1 Tab Oral Q6H PRN        Objective:   Vitals:  Visit Vitals  /81   Pulse (!) 53   Temp 97.6 °F (36.4 °C)   Resp 18   Ht 5' 0.5\" (1.537 m)   Wt 150 lb (68 kg)   LMP 2020   SpO2 100%   BMI 28.81 kg/m²     Temp (24hrs), Av.8 °F (36.6 °C), Min:97.6 °F (36.4 °C), Max:98 °F (36.7 °C)      O2 Device: Room air      PHYSICAL EXAM:  General:    Alert, cooperative, no distress, appears stated age. Head:   Normocephalic, without obvious abnormality, atraumatic. Eyes:   Conjunctivae/corneas clear. PERRLA  Nose:  Nares normal. No drainage or sinus tenderness. Throat:    Lips, mucosa, and tongue normal.  No Thrush  Neck:  Supple, symmetrical,  no adenopathy, thyroid: non tender    no carotid bruit and no JVD. Paraspinal tenderness  Back:    Symmetric,  No CVA tenderness. Lungs:   Clear to auscultation bilaterally. No Wheezing or Rhonchi. No rales. Chest wall:  No tenderness or deformity. No Accessory muscle use. Heart:   Regular rate and rhythm,  no murmur, rub or gallop. Abdomen:   Soft, non-tender. Not distended. Bowel sounds normal. No masses  Extremities: Extremities normal, atraumatic, No cyanosis. No edema. No clubbing  Skin:     Texture, turgor normal. No rashes or lesions. Not Jaundiced  Lymph nodes: Cervical, supraclavicular normal.  Psych:  Good insight. Not depressed. Anxious. NEUROLOGICAL EXAM:  Appearance: The patient is well developed, well nourished, provides a coherent history and is in no acute distress. Mental Status: Oriented to time, place and person. Mood and affect appropriate.    Cranial Nerves:   Intact visual fields. Fundi are benign. HERVE, EOM's full, no nystagmus, no ptosis. Facial sensation is normal. Corneal reflexes are intact. Facial movement is symmetric. Hearing is normal bilaterally. Palate is midline with normal sternocleidomastoid and trapezius muscles are normal. Tongue is midline. Motor:  5/5 strength in upper and lower proximal and distal muscles. Normal bulk and tone. No fasciculations. Reflexes:   Deep tendon reflexes 2+/4 and symmetrical.   Sensory:   Equivocal sensation to touch, pinprick and vibration. Gait:  Slightly unsteady gait. Tremor:   No tremor noted. Cerebellar:  No cerebellar signs present. Neurovascular:  Normal heart sounds and regular rhythm, peripheral pulses intact, and no carotid bruits.          Lab Data Reviewed:    Recent Results (from the past 24 hour(s))   GLUCOSE, POC    Collection Time: 03/10/20  7:36 AM   Result Value Ref Range    Glucose (POC) 125 (H) 65 - 100 mg/dL    Performed by Tommas Friend    GLUCOSE, POC    Collection Time: 03/10/20 11:35 AM   Result Value Ref Range    Glucose (POC) 117 (H) 65 - 100 mg/dL    Performed by Tommas Friend    GLUCOSE, POC    Collection Time: 03/10/20  4:11 PM   Result Value Ref Range    Glucose (POC) 121 (H) 65 - 100 mg/dL    Performed by Tommas Friend    GLUCOSE, POC    Collection Time: 03/10/20  8:26 PM   Result Value Ref Range    Glucose (POC) 184 (H) 65 - 100 mg/dL    Performed by Delpha Nissen (PCT)        Assesment  Principal Problem:    Multiple sclerosis exacerbation ([facility] [address] (3/7/2020)    Active Problems:    MS (multiple sclerosis) (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 Solumedrol 1g iv x1dose  2. Continue rest of management  Advised on the dangers of tobacco abuse  Advised on the benefits of discontinuation   Advised on available treatments.    10 minutes spent on counseling.         ___________________________________________________    Total time spent with patient:  []15   []25   [x]35   [] __ minutes    []Critical Care Provided        ___________________________________________________    Attending Physician: Maria Elena Brantley MD

## 2020-03-18 ENCOUNTER — TELEPHONE (OUTPATIENT)
Dept: CASE MANAGEMENT | Age: 35
End: 2020-03-18

## 2020-03-18 NOTE — TELEPHONE ENCOUNTER
Called patient on 3/12/20 for follow-up call. Patient did not answer and CM left message to return call.   ZOILA Eaton/JESSE  [phone]

## 2020-03-20 ENCOUNTER — OFFICE VISIT (OUTPATIENT)
Dept: NEUROLOGY | Age: 35
End: 2020-03-20

## 2020-03-20 VITALS
OXYGEN SATURATION: 99 % | RESPIRATION RATE: 16 BRPM | SYSTOLIC BLOOD PRESSURE: 129 MMHG | WEIGHT: 147.8 LBS | BODY MASS INDEX: 27.2 KG/M2 | HEART RATE: 68 BPM | DIASTOLIC BLOOD PRESSURE: 87 MMHG | TEMPERATURE: 98.1 F | HEIGHT: 62 IN

## 2020-03-20 DIAGNOSIS — F33.1 MODERATE EPISODE OF RECURRENT MAJOR DEPRESSIVE DISORDER (HCC): ICD-10-CM

## 2020-03-20 DIAGNOSIS — H46.9 OPTIC NEURITIS: ICD-10-CM

## 2020-03-20 DIAGNOSIS — G35 MS (MULTIPLE SCLEROSIS) (HCC): Primary | ICD-10-CM

## 2020-03-20 DIAGNOSIS — G43.019 INTRACTABLE MIGRAINE WITHOUT AURA AND WITHOUT STATUS MIGRAINOSUS: ICD-10-CM

## 2020-03-20 RX ORDER — ESCITALOPRAM OXALATE 10 MG/1
10 TABLET ORAL DAILY
Qty: 30 TAB | Refills: 2 | Status: SHIPPED | OUTPATIENT
Start: 2020-03-20 | End: 2022-04-27

## 2020-03-20 RX ORDER — DIVALPROEX SODIUM 250 MG/1
250 TABLET, EXTENDED RELEASE ORAL
Qty: 30 TAB | Refills: 2 | Status: SHIPPED | OUTPATIENT
Start: 2020-03-20 | End: 2022-04-27 | Stop reason: ALTCHOICE

## 2020-03-20 RX ORDER — SUMATRIPTAN 100 MG/1
TABLET, FILM COATED ORAL
Qty: 12 TAB | Refills: 3 | Status: SHIPPED | OUTPATIENT
Start: 2020-03-20 | End: 2022-04-27

## 2020-03-20 NOTE — PROGRESS NOTES
Neurology Consult Note      HISTORY PROVIDED BY: patient    Chief Complaint:   Chief Complaint   Patient presents with   [address] Follow Up     [facility] 3/7/20      Subjective:    Cleopatra Quan is a 29 y.o. right handed female who presents in consultation for MS  This is a 22-year-old right-handed black female with history of Graves' disease, migraine headache, hypothyroidism, anemia, multiple sclerosis, who was recently hospitalized because of multiple sclerosis exacerbation. Patient apparently was diagnosed with multiple sclerosis about 5 years ago, was previously on Betaseron but because of pregnancy and relocation  from [address] [facility] to [place] she was unable to continue with her DMD.  Patient had a relapse in which she was falling with lower extremity weakness, was admitted return [facility], had pulsed steroid, improved, MRI of the brain showed demyelinating lesion with enhancement of the lesion in the beba consistent with new lesion, however MRI of the cervical and thoracic spine was unremarkable for cord lesion. She was discharged to continue with outpatient physical therapy, follow-up with outpatient neurology clinic. While patient was on admission, she was also experiencing a lot of headaches. Today, even though patient still has weakness of the left lower extremity, she is making some progress, she also says her headache is improved. Patient noted that she has been having also numbness and tingling sensation of the extremities mostly the hands. Tingling sensation sometimes wakes patient up at night. She denies any fall since discharge from the hospital, denies difficulty swallowing. After discussion with patient, I will start patient on Aubagio. She says she still experiences headache about 2-3 times a week, headache is throbbing in nature mostly frontal, with occasional sharp pain coming from the back of the head.   Headache associated with nausea occasionally photophobia, phonophobia, blurry vision. It should be noted also that while patient was in the hospital she lost her vision in the left eye, today patient says her vision has come back.   Review of Systems - General ROS: positive for  - fatigue and sleep disturbance  Psychological ROS: positive for - anxiety, concentration difficulties, depression, mood swings and sleep disturbances  Ophthalmic ROS: positive for - blurry vision, decreased vision, double vision and photophobia  ENT ROS: positive for - headaches, tinnitus, vertigo and visual changes  Allergy and Immunology ROS: negative  Hematological and Lymphatic ROS: negative  Endocrine ROS: negative  Respiratory ROS: no cough, shortness of breath, or wheezing  Cardiovascular ROS: no chest pain or dyspnea on exertion  Gastrointestinal ROS: no abdominal pain, change in bowel habits, or black or bloody stools  Genito-Urinary ROS: no dysuria, trouble voiding, or hematuria  Musculoskeletal ROS: positive for - gait disturbance, joint pain, muscle pain and muscular weakness  Neurological ROS: positive for - dizziness, gait disturbance, headaches, impaired coordination/balance, numbness/tingling, visual changes and weakness  Dermatological ROS: negative    Past Medical History:   Diagnosis Date    Anemia     Depression     Falls     Fatigue     Frequent headaches     Graves disease     Incontinence     Leg swelling     Memory disorder     Multiple sclerosis (HCC)     Muscle pain     Muscle weakness     Unintentional weight change       Past Surgical History:   Procedure Laterality Date    HX  SECTION      x4 occurences    HX FREE SKIN GRAFT        Social History     Socioeconomic History    Marital status: SINGLE     Spouse name: Not on file    Number of children: Not on file    Years of education: Not on file    Highest education level: Not on file   Occupational History    Not on file   Social Needs    Financial resource strain: Not on file  Food insecurity     Worry: Not on file     Inability: Not on file    Transportation needs     Medical: Not on file     Non-medical: Not on file   Tobacco Use    Smoking status: Former Smoker     Packs/day: 1.00     Years: 15.00     Pack years: 15.00    Smokeless tobacco: Former User     Quit date: 3/1/2020   Substance and Sexual Activity    Alcohol use: Yes     Comment: weekens only    Drug use: Not Currently    Sexual activity: Not Currently   Lifestyle    Physical activity     Days per week: Not on file     Minutes per session: Not on file    Stress: Not on file   Relationships    Social connections     Talks on phone: Not on file     Gets together: Not on file     Attends Druze service: Not on file     Active member of club or organization: Not on file     Attends meetings of clubs or organizations: Not on file     Relationship status: Not on file    Intimate partner violence     Fear of current or ex partner: Not on file     Emotionally abused: Not on file     Physically abused: Not on file     Forced sexual activity: Not on file   Other Topics Concern    Not on file   Social History Narrative    Not on file     History reviewed. No pertinent family history. Objective:   ROS    Allergies   Allergen Reactions    Onion Anaphylaxis    Raspberry Anaphylaxis    Caffeine Other (comments)     Reports \"bad shaking\"        Meds:  Outpatient Medications Prior to Visit   Medication Sig Dispense Refill    levothyroxine (SYNTHROID) 50 mcg tablet Take 1 Tab by mouth Daily (before breakfast). 30 Tab 0    cephALEXin (KEFLEX) 500 mg capsule Take 1 Cap by mouth two (2) times a day. First dose 3/11 evening. 6 Cap 0    topiramate (TOPAMAX) 25 mg tablet Take 1 Tab by mouth nightly. 30 Tab 0    thiamine HCL (B-1) 100 mg tablet Take 1 Tab by mouth daily. 30 Tab 0    folic acid (FOLVITE) 1 mg tablet Take 1 Tab by mouth daily.  30 Tab 0    ergocalciferol (ERGOCALCIFEROL) 1,250 mcg (50,000 unit) capsule Take 1 Cap by mouth every seven (7) days. 7 Cap 0    predniSONE (DELTASONE) 10 mg tablet Take 70 mg by mouth daily (with breakfast) for 3 days, THEN 60 mg daily (with breakfast) for 3 days, THEN 50 mg daily (with breakfast) for 3 days, THEN 40 mg daily (with breakfast) for 3 days, THEN 30 mg daily (with breakfast) for 3 days, THEN 20 mg daily (with breakfast) for 3 days, THEN 10 mg daily (with breakfast) for 3 days. 84 Tab 0    nicotine (NICODERM CQ) 7 mg/24 hr 1 Patch by TransDERmal route every twenty-four (24) hours for 30 days. 30 Patch 0     No facility-administered medications prior to visit. Imaging:  MRI Results (most recent):  Results from Hospital Encounter encounter on 03/07/20   MRI [facility] SPINE W WO CONT    Narrative EXAM:  MRI [facility] SPINE W WO CONT    INDICATION:   MS exacerbation, right leg weakness    COMPARISON: None. TECHNIQUE: Multiplanar multisequence acquisition without and with contrast of  the thoracic spine. CONTRAST: 14 cc Dotarem. FINDINGS:  The thoracic cord is normal in size and signal. No evidence of abnormal  intraspinal or osseous enhancement. There is normal alignment of the thoracic spine. Vertebral body heights are  maintained without evidence of acute fracture. Marrow signal is normal. Disc  spaces are preserved with no significant disc herniation, spinal canal or neural  foraminal stenosis at any level. Visualized soft tissues are unremarkable. Impression IMPRESSION:    1. Normal thoracic cord. No evidence of abnormal enhancement. Normal thoracic  spine MRI. CT Results (most recent):  Results from Hospital Encounter encounter on 03/07/20   CT HEAD WO CONT    Narrative EXAM:  CT HEAD WO CONT  INDICATION:   headache; syncope, fall; MS  Additional history:  COMPARISON: None. .  TECHNIQUE:   Unenhanced CT of the head was performed using 5 mm images. Coronal and sagittal  reformats were produced. Brain and bone windows were generated.    CT dose reduction was achieved through use of a standardized protocol tailored  for this examination and automatic exposure control for dose modulation. Kim Tang FINDINGS:  The ventricles and sulci are normal in size, shape and configuration and  midline. Subcortical and periventricular white matter hypoattenuation. There is  no intracranial hemorrhage, extra-axial collection, mass, mass effect or midline  shift. The basilar cisterns are open. No acute infarct is identified. The bone windows demonstrate no abnormalities. The visualized portions of the  paranasal sinuses and mastoid air cells are clear. .    Impression IMPRESSION:   1. No definite acute intracranial abnormality by this modality. 2. Areas of subcortical white matter hypoattenuation which is consistent with  the clinical history of multiple sclerosis. Reviewed records in [employer] and [employer] tab today    Lab Review   Results for orders placed or performed during the hospital encounter of 03/07/20   CULTURE, URINE   Result Value Ref Range    Special Requests: NO SPECIAL REQUESTS  Reflexed from [id number]        [place] Count >100,000  COLONIES/mL        Culture result: ESCHERICHIA COLI (A)         Susceptibility    Escherichia coli - RADHA     Amikacin ($) <=2 Susceptible ug/mL     Ampicillin ($) >=32 Resistant ug/mL     Ampicillin/sulbactam ($) >=32 Resistant ug/mL     Cefazolin ($) <=4 Susceptible ug/mL     Cefepime ($$) <=1 Susceptible ug/mL     Cefoxitin <=4 Susceptible ug/mL     Ceftazidime ($) <=1 Susceptible ug/mL     Ceftriaxone ($) <=1 Susceptible ug/mL     Ciprofloxacin ($) <=0.25 Susceptible ug/mL     Gentamicin ($) <=1 Susceptible ug/mL     Levofloxacin ($) <=0.12 Susceptible ug/mL     Meropenem ($$) <=0.25 Susceptible ug/mL     Nitrofurantoin <=16 Susceptible ug/mL     Piperacillin/Tazobac ($) <=4 Susceptible ug/mL     Tobramycin ($) <=1 Susceptible ug/mL     Trimeth/Sulfa >=320 Resistant ug/mL     Ext. Spec.  Beta Lactamase NEGATIVE Susceptible ug/mL   CBC WITH AUTOMATED DIFF   Result Value Ref Range    WBC 4.5 3.6 - 11.0 K/uL    RBC 3.46 (L) 3.80 - 5.20 M/uL    HGB 8.8 (L) 11.5 - 16.0 g/dL    HCT 28.9 (L) 35.0 - 47.0 %    MCV 83.5 80.0 - 99.0 FL    MCH 25.4 (L) 26.0 - 34.0 PG    MCHC 30.4 30.0 - 36.5 g/dL    RDW 16.5 (H) 11.5 - 14.5 %    PLATELET 771 356 - 969 K/uL    MPV 9.7 8.9 - 12.9 FL    NRBC 0.0 0  WBC    ABSOLUTE NRBC 0.00 0.00 - 0.01 K/uL    NEUTROPHILS 55 32 - 75 %    LYMPHOCYTES 35 12 - 49 %    MONOCYTES 9 5 - 13 %    EOSINOPHILS 1 0 - 7 %    BASOPHILS 0 0 - 1 %    IMMATURE GRANULOCYTES 0 0.0 - 0.5 %    ABS. NEUTROPHILS 2.4 1.8 - 8.0 K/UL    ABS. LYMPHOCYTES 1.6 0.8 - 3.5 K/UL    ABS. MONOCYTES 0.4 0.0 - 1.0 K/UL    ABS. EOSINOPHILS 0.0 0.0 - 0.4 K/UL    ABS. BASOPHILS 0.0 0.0 - 0.1 K/UL    ABS. IMM. GRANS. 0.0 0.00 - 0.04 K/UL    DF AUTOMATED     METABOLIC PANEL, COMPREHENSIVE   Result Value Ref Range    Sodium 140 136 - 145 mmol/L    Potassium 3.3 (L) 3.5 - 5.1 mmol/L    Chloride 104 97 - 108 mmol/L    CO2 30 21 - 32 mmol/L    Anion gap 6 5 - 15 mmol/L    Glucose 91 65 - 100 mg/dL    BUN 11 6 - 20 MG/DL    Creatinine 1.23 (H) 0.55 - 1.02 MG/DL    BUN/Creatinine ratio 9 (L) 12 - 20      GFR est AA >60 >60 ml/min/1.73m2    GFR est non-AA 50 (L) >60 ml/min/1.73m2    Calcium 8.5 8.5 - 10.1 MG/DL    Bilirubin, total 0.4 0.2 - 1.0 MG/DL    ALT (SGPT) 37 12 - 78 U/L    AST (SGOT) 57 (H) 15 - 37 U/L    Alk.  phosphatase 82 45 - 117 U/L    Protein, total 7.0 6.4 - 8.2 g/dL    Albumin 3.6 3.5 - 5.0 g/dL    Globulin 3.4 2.0 - 4.0 g/dL    A-G Ratio 1.1 1.1 - 2.2     URINALYSIS W/ REFLEX CULTURE   Result Value Ref Range    Color DARK YELLOW      Appearance CLOUDY (A) CLEAR      Specific gravity 1.025 1.003 - 1.030      pH (UA) 6.0 5.0 - 8.0      Protein TRACE (A) NEG mg/dL    Glucose NEGATIVE  NEG mg/dL    Ketone NEGATIVE  NEG mg/dL    Bilirubin NEGATIVE  NEG      Blood NEGATIVE  NEG      Urobilinogen 1.0 0.2 - 1.0 EU/dL    Nitrites POSITIVE (A) NEG      Leukocyte Esterase MODERATE (A) NEG      WBC 5-10 0 - 4 /hpf    RBC 0-5 0 - 5 /hpf    Epithelial cells MODERATE (A) FEW /lpf    Bacteria 4+ (A) NEG /hpf    UA:UC IF INDICATED URINE CULTURE ORDERED (A) CNI     SED RATE (ESR)   Result Value Ref Range    Sed rate, automated 21 (H) 0 - 20 mm/hr   CK   Result Value Ref Range     (H) 26 - 192 U/L   INFLUENZA A+B VIRAL AGS   Result Value Ref Range    Influenza A Antigen NEGATIVE  NEG      Influenza B Antigen NEGATIVE  NEG     TROPONIN I   Result Value Ref Range    Troponin-I, Qt. <0.05 <0.05 ng/mL   DRUG SCREEN, URINE   Result Value Ref Range    AMPHETAMINES NEGATIVE  NEG      BARBITURATES NEGATIVE  NEG      BENZODIAZEPINES NEGATIVE  NEG      COCAINE POSITIVE (A) NEG      METHADONE NEGATIVE  NEG      OPIATES NEGATIVE  NEG      PCP(PHENCYCLIDINE) NEGATIVE  NEG      THC (TH-CANNABINOL) NEGATIVE  NEG      Drug screen comment (NOTE)    ETHYL ALCOHOL   Result Value Ref Range    ALCOHOL(ETHYL),SERUM <10 <10 MG/DL   LIPASE   Result Value Ref Range    Lipase 237 73 - 393 U/L   TSH 3RD GENERATION   Result Value Ref Range    TSH 43.78 (H) 0.36 - 3.74 uIU/mL   IRON PROFILE   Result Value Ref Range    Iron 16 (L) 35 - 150 ug/dL    TIBC 468 (H) 250 - 450 ug/dL    Iron % saturation 3 (L) 20 - 50 %   T3, FREE   Result Value Ref Range    Free Triiodothyronine (T3) <0.5 (L) 2.2 - 4.0 pg/mL   T4, FREE   Result Value Ref Range    T4, Free 0.2 (L) 0.8 - 1.5 NG/DL   SED RATE (ESR)   Result Value Ref Range    Sed rate, automated 19 0 - 20 mm/hr   METABOLIC PANEL, BASIC   Result Value Ref Range    Sodium 139 136 - 145 mmol/L    Potassium 3.6 3.5 - 5.1 mmol/L    Chloride 104 97 - 108 mmol/L    CO2 25 21 - 32 mmol/L    Anion gap 10 5 - 15 mmol/L    Glucose 161 (H) 65 - 100 mg/dL    BUN 12 6 - 20 MG/DL    Creatinine 1.08 (H) 0.55 - 1.02 MG/DL    BUN/Creatinine ratio 11 (L) 12 - 20      GFR est AA >60 >60 ml/min/1.73m2    GFR est non-AA 58 (L) >60 ml/min/1.73m2    Calcium 8.3 (L) 8.5 - 10.1 MG/DL   CBC WITH AUTOMATED DIFF   Result Value Ref Range    WBC 3.7 3.6 - 11.0 K/uL    RBC 3.17 (L) 3.80 - 5.20 M/uL    HGB 8.1 (L) 11.5 - 16.0 g/dL    HCT 26.0 (L) 35.0 - 47.0 %    MCV 82.0 80.0 - 99.0 FL    MCH 25.6 (L) 26.0 - 34.0 PG    MCHC 31.2 30.0 - 36.5 g/dL    RDW 16.4 (H) 11.5 - 14.5 %    PLATELET 886 849 - 016 K/uL    MPV 9.9 8.9 - 12.9 FL    NRBC 0.0 0  WBC    ABSOLUTE NRBC 0.00 0.00 - 0.01 K/uL    NEUTROPHILS 77 (H) 32 - 75 %    LYMPHOCYTES 21 12 - 49 %    MONOCYTES 1 (L) 5 - 13 %    EOSINOPHILS 0 0 - 7 %    BASOPHILS 0 0 - 1 %    IMMATURE GRANULOCYTES 1 (H) 0.0 - 0.5 %    ABS. NEUTROPHILS 2.9 1.8 - 8.0 K/UL    ABS. LYMPHOCYTES 0.8 0.8 - 3.5 K/UL    ABS. MONOCYTES 0.0 0.0 - 1.0 K/UL    ABS. EOSINOPHILS 0.0 0.0 - 0.4 K/UL    ABS. BASOPHILS 0.0 0.0 - 0.1 K/UL    ABS. IMM.  GRANS. 0.0 0.00 - 0.04 K/UL    DF SMEAR SCANNED      RBC COMMENTS ANISOCYTOSIS  1+        RBC COMMENTS HYPOCHROMIA  1+       MAGNESIUM   Result Value Ref Range    Magnesium 2.4 1.6 - 2.4 mg/dL   AMMONIA   Result Value Ref Range    Ammonia 10 <32 UMOL/L   LIPASE   Result Value Ref Range    Lipase 219 73 - 393 U/L   VITAMIN B12   Result Value Ref Range    Vitamin B12 420 193 - 986 pg/mL   CK   Result Value Ref Range     (H) 26 - 192 U/L   VITAMIN D, 25 HYDROXY   Result Value Ref Range    Vitamin D 25-Hydroxy 13.7 (L) 30 - 100 ng/mL   SED RATE (ESR)   Result Value Ref Range    Sed rate, automated 20 0 - 20 mm/hr   PROTEIN ELECTROPHORESIS   Result Value Ref Range    Protein, total 6.6 6.0 - 8.5 g/dL    Albumin 3.6 2.9 - 4.4 g/dL    Alpha-1-globulin 0.2 0.0 - 0.4 g/dL    ALPHA-2 GLOBULIN 0.7 0.4 - 1.0 g/dL    Beta globulin 0.9 0.7 - 1.3 g/dL    Gamma globulin 1.2 0.4 - 1.8 g/dL    M-Ector Not Observed Not Observed g/dL    Globulin, total 3.0 2.2 - 3.9 g/dL    A/G ratio 1.2 0.7 - 1.7     GLUCOSE, POC   Result Value Ref Range    Glucose (POC) 205 (H) 65 - 100 mg/dL    Performed by Lakshmi Garcia    GLUCOSE, POC   Result Value Ref Range    Glucose (POC) 143 (H) 65 - 100 mg/dL    Performed by Parker Dellen    GLUCOSE, POC   Result Value Ref Range    Glucose (POC) 155 (H) 65 - 100 mg/dL    Performed by Becky Johnson    GLUCOSE, POC   Result Value Ref Range    Glucose (POC) 114 (H) 65 - 100 mg/dL    Performed by Spur Johnson    GLUCOSE, POC   Result Value Ref Range    Glucose (POC) 114 (H) 65 - 100 mg/dL    Performed by Spur Johnson    GLUCOSE, POC   Result Value Ref Range    Glucose (POC) 199 (H) 65 - 100 mg/dL    Performed by Parker Dellen    GLUCOSE, POC   Result Value Ref Range    Glucose (POC) 129 (H) 65 - 100 mg/dL    Performed by Becky Johnson    GLUCOSE, POC   Result Value Ref Range    Glucose (POC) 124 (H) 65 - 100 mg/dL    Performed by Rupal Leigh    GLUCOSE, POC   Result Value Ref Range    Glucose (POC) 148 (H) 65 - 100 mg/dL    Performed by Spur Johnson    GLUCOSE, POC   Result Value Ref Range    Glucose (POC) 161 (H) 65 - 100 mg/dL    Performed by Parker Dellen    GLUCOSE, POC   Result Value Ref Range    Glucose (POC) 125 (H) 65 - 100 mg/dL    Performed by Neal Manifold    GLUCOSE, POC   Result Value Ref Range    Glucose (POC) 117 (H) 65 - 100 mg/dL    Performed by Neal Manifold    GLUCOSE, POC   Result Value Ref Range    Glucose (POC) 121 (H) 65 - 100 mg/dL    Performed by Neal Manifold    GLUCOSE, POC   Result Value Ref Range    Glucose (POC) 184 (H) 65 - 100 mg/dL    Performed by Kip Zee (PCT)    GLUCOSE, POC   Result Value Ref Range    Glucose (POC) 128 (H) 65 - 100 mg/dL    Performed by Elizabeth Al (PCT)    GLUCOSE, POC   Result Value Ref Range    Glucose (POC) 116 (H) 65 - 100 mg/dL    Performed by Elizabeth Al (PCT)    EKG, 12 LEAD, INITIAL   Result Value Ref Range    Ventricular Rate 51 BPM    Atrial Rate 51 BPM    P-R Interval 226 ms    QRS Duration 84 ms    Q-T Interval 418 ms    QTC Calculation (Bezet) 385 ms    Calculated P Axis 64 degrees    Calculated R Axis 6 degrees    Calculated T Axis 39 degrees    Diagnosis       Sinus bradycardia with 1st degree AV block  Low voltage QRS  Borderline ECG  No previous ECGs available  Confirmed by Darinel Cervantes M.D., [place] ([id number]) on 3/9/2020 7:34:45 AM          Exam:  Visit Vitals  /87   Pulse 68   Temp 98.1 °F (36.7 °C) (Temporal)   Resp 16   Ht 5' 2\" (1.575 m)   Wt 147 lb 12.8 oz (67 kg)   SpO2 99%   BMI 27.03 kg/m²     General:  Alert, cooperative, no distress. Head:  Normocephalic, without obvious abnormality, atraumatic. Respiratory:  Heart:   Non labored breathing  Regular rate and rhythm, no murmurs   Neck:   2+ carotids, no bruits   Extremities: Warm, no cyanosis or edema. Pulses: 2+ radial pulses. Neurologic:  MS: Alert and oriented x 4, speech intact. Language intact, able to name, repeat, and follow all commands. Attention and fund of knowledge appropriate. Recent and remote memory intact. Cranial Nerves:  II: visual fields Full to confrontation   II: pupils Equal, round, reactive to light   II: optic disc No papilledema   III,VII: ptosis none   III,IV,VI: extraocular muscles  EOMI, no nystagmus or diplopia   V: facial light touch sensation  normal   VII: facial muscle function   symmetric   VIII: hearing intact   IX: soft palate elevation  normal   XI: trapezius strength  5/5   XI: sternocleidomastoid strength 5/5   XII: tongue  Midline     Motor: normal bulk and tone, no tremor              Strength: 5-/5 upper extremity, 4+/5 left lower extremity. Sensory: Dysesthesia to LT, PP, temperature and vibration  Coordination: FTN and HTS intact, ADA intact,Romberg positive  Gait: Unsteady gait, unable to heel, toe, and tandem walk  Reflexes: 3+ symmetric. Plantar: Mute           Assessment/Plan       ICD-10-CM ICD-9-CM    1. MS (multiple sclerosis) ([facility] [address] G35 340    2. Optic neuritis H46.9 377.30    3. Chronic migraine G43.709 346.70    4.  Intractable migraine without aura and without status migrainosus G43.019 346.11    5. Moderate episode of recurrent major depressive disorder (HCC) F33.1 296.32 REFERRAL TO PSYCHIATRY   Plan:  I will continue patient on her current medication, after discussing with patient, I will start patient on [id number] p.o. daily as that is considered the best for patient at this time. I will refer patient to psychiatry, while waiting for psychiatric referral, patient is started on Lexapro 20 mg p.o. daily. Continue with outpatient physical therapy. Follow-up and Dispositions    · Return in about 3 months (around 6/20/2020). Thank you very much for this consultation.          Signed:  Bety German MD  3/20/2020

## 2020-03-30 ENCOUNTER — TELEPHONE (OUTPATIENT)
Dept: NEUROLOGY | Age: 35
End: 2020-03-30

## 2020-03-30 NOTE — TELEPHONE ENCOUNTER
Spoke with Elaine ghotra, who asked if Bib Bocanegra could check dosage in sections 7 and 8 of the Aubagio start form and resend it to [facility]. Once they have the updated form and PA complete, they will be able to get medication to patient. PA pending as urgent.

## 2020-03-30 NOTE — TELEPHONE ENCOUNTER
Prior Authorization submitted URGENTLY for Cesilia to [employer] via Cover My Meds. Status Pending. Allow 24 hours for response.      CMM Key: [id number]

## 2020-04-03 ENCOUNTER — TELEPHONE (OUTPATIENT)
Dept: NEUROLOGY | Age: 35
End: 2020-04-03

## 2020-04-03 NOTE — TELEPHONE ENCOUNTER
Prior Authorization DENIED for Aubagio 14mg by [facility]. Denial reason states:     -Patient must have trial/failure or contraindication to at least TWO of the following meds: Avonex, Copaxone, Glatiramer, Rebif. Can appeal decision , or provider can prescribe formulary alternative. Denial scanned into media for review.

## 2020-04-09 NOTE — TELEPHONE ENCOUNTER
Appeal submitted for Aubagio to [facility] via Fax. Status Pendin. Will call insurance if no response by 04/23/20.

## 2020-04-21 ENCOUNTER — TELEPHONE (OUTPATIENT)
Dept: NEUROLOGY | Age: 35
End: 2020-04-21

## 2020-04-21 NOTE — TELEPHONE ENCOUNTER
Prior Authorization APPROVED for Aubagio by [facility]. Effective dates 04/21/20 - 04/21/21. Case #[id number]. Approval will be scanned into media. Pharmacy has been notified of approval via V/M.

## 2021-05-03 ENCOUNTER — TELEPHONE (OUTPATIENT)
Dept: NEUROLOGY | Age: 36
End: 2021-05-03

## 2021-05-03 NOTE — TELEPHONE ENCOUNTER
Rec'd request for Aubagio renewal. Prev one  last month. Last office visit was 3/20/20 - will need a f/u appointment before renewing Aubagio. Forwarding to Lizette to schedule an office appointment.      [address], [address] [facility] ([facility])  Covered: Retail, Mail Order Unknown: Specialty, Long-Term Care               Member ID: [id number] 1985 - F     Group ID: [id number] [facility]      Group Name: [facility]

## 2021-05-04 ENCOUNTER — TELEPHONE (OUTPATIENT)
Dept: NEUROLOGY | Age: 36
End: 2021-05-04

## 2021-05-04 NOTE — TELEPHONE ENCOUNTER
Patient was called writer left a message at 8:43 am, awaiting return call back, to get patient scheduled for an appt.

## 2021-10-23 ENCOUNTER — HOSPITAL ENCOUNTER (EMERGENCY)
Age: 36
Discharge: HOME | End: 2021-10-23
Attending: STUDENT IN AN ORGANIZED HEALTH CARE EDUCATION/TRAINING PROGRAM
Payer: COMMERCIAL

## 2021-10-23 VITALS
WEIGHT: 150 LBS | TEMPERATURE: 98.4 F | BODY MASS INDEX: 27.6 KG/M2 | HEART RATE: 70 BPM | DIASTOLIC BLOOD PRESSURE: 91 MMHG | HEIGHT: 62 IN | OXYGEN SATURATION: 96 % | RESPIRATION RATE: 16 BRPM | SYSTOLIC BLOOD PRESSURE: 132 MMHG

## 2021-10-23 DIAGNOSIS — N92.1 MENORRHAGIA WITH IRREGULAR CYCLE: Primary | ICD-10-CM

## 2021-10-23 DIAGNOSIS — N94.6 MENSTRUAL CRAMP: ICD-10-CM

## 2021-10-23 LAB — HCG SERPL-ACNC: <1 MIU/ML (ref 0–6)

## 2021-10-23 PROCEDURE — 84702 CHORIONIC GONADOTROPIN TEST: CPT

## 2021-10-23 PROCEDURE — 36415 COLL VENOUS BLD VENIPUNCTURE: CPT

## 2021-10-23 PROCEDURE — 74011250637 HC RX REV CODE- 250/637: Performed by: STUDENT IN AN ORGANIZED HEALTH CARE EDUCATION/TRAINING PROGRAM

## 2021-10-23 PROCEDURE — 99283 EMERGENCY DEPT VISIT LOW MDM: CPT

## 2021-10-23 RX ORDER — FLUOXETINE HYDROCHLORIDE 20 MG/1
20 CAPSULE ORAL DAILY
COMMUNITY
Start: 2021-08-26 | End: 2022-04-27

## 2021-10-23 RX ORDER — ACETAMINOPHEN 500 MG
1000 TABLET ORAL
Status: COMPLETED | OUTPATIENT
Start: 2021-10-23 | End: 2021-10-23

## 2021-10-23 RX ADMIN — ACETAMINOPHEN 1000 MG: 500 TABLET ORAL at 20:16

## 2021-10-23 NOTE — ED PROVIDER NOTES
EMERGENCY DEPARTMENT HISTORY AND PHYSICAL EXAM      Date: 10/23/2021  Patient Name: [facility]    History of Presenting Illness     Chief Complaint   Patient presents with    Vaginal Bleeding    Abdominal Pain       History Provided By: Patient    HPI: [facility], 28 y.o. female with past medical history of MS, hypothyroidism, tobacco abuse, cocaine use, alcohol use, presents to the ED with cc of heavy vaginal bleeding. Patient reports that she is concerned for possibility of miscarriage. States that she recently took a home pregnancy test, which was faintly positive. She describes a [employer] up, indicating positive pregnancy-however, states that the bullae was extremely faint. Therefore, patient is unsure if she is truly pregnant. States that since earlier today, she began experiencing heavy vaginal bleeding. This is associated with painful bilateral lower pelvic cramping. She reports a history of heavy vaginal bleeding, but worries current episode of bleeding is abnormal as the blood being passed is \"darker than usual.\" She reports passing a large blood clot earlier tonight and experiencing \"contraction-like pelvic pain. \"  Patient states that she was placed on a hormonal birth control in the past to control her heavy menstrual periods. However, she recently came off this medication as she is planning to have a Nexplanon inserted. Patient reports last menstrual period was at the beginning of September. Patient otherwise denies any abnormal vaginal discharge, urinary symptoms. PCP: None    No current facility-administered medications on file prior to encounter. Current Outpatient Medications on File Prior to Encounter   Medication Sig Dispense Refill    teriflunomide (AUBAGIO) 14 mg tablet Take 1 Tab by mouth daily.  30 Tab 3    SUMAtriptan (IMITREX) 100 mg tablet 1 tab at onset of moderate-severe migraine; may repeat 1 tab in 2 hours; Limit: 2 tabs in 24/ hrs, not more than 3 days a week 12 Tab 3    divalproex ER (Depakote ER) 250 mg ER tablet Take 1 Tab by mouth nightly. 30 Tab 2    escitalopram oxalate (LEXAPRO) 10 mg tablet Take 1 Tab by mouth daily. 30 Tab 2    levothyroxine (SYNTHROID) 50 mcg tablet Take 1 Tab by mouth Daily (before breakfast). 30 Tab 0    cephALEXin (KEFLEX) 500 mg capsule Take 1 Cap by mouth two (2) times a day. First dose 3/11 evening. 6 Cap 0    topiramate (TOPAMAX) 25 mg tablet Take 1 Tab by mouth nightly. 30 Tab 0    thiamine HCL (B-1) 100 mg tablet Take 1 Tab by mouth daily. 30 Tab 0    folic acid (FOLVITE) 1 mg tablet Take 1 Tab by mouth daily. 30 Tab 0    ergocalciferol (ERGOCALCIFEROL) 1,250 mcg (50,000 unit) capsule Take 1 Cap by mouth every seven (7) days. 7 Cap 0       Past History     Past Medical History:  Past Medical History:   Diagnosis Date    Anemia     Depression     Falls     Fatigue     Frequent headaches     Graves disease     Incontinence     Leg swelling     Memory disorder     Multiple sclerosis (HCC)     Muscle pain     Muscle weakness     Unintentional weight change        Past Surgical History:  Past Surgical History:   Procedure Laterality Date    HX  SECTION      x4 occurences    HX FREE SKIN GRAFT         Family History:  No family history on file. Social History:  Social History     Tobacco Use    Smoking status: Former Smoker     Packs/day: 1.00     Years: 15.00     Pack years: 15.00    Smokeless tobacco: Former User     Quit date: 3/1/2020   Substance Use Topics    Alcohol use: Yes     Comment: weekens only    Drug use: Not Currently       Allergies: Allergies   Allergen Reactions    Onion Anaphylaxis    Raspberry Anaphylaxis    Caffeine Other (comments)     Reports \"bad shaking\"         Review of Systems   Review of Systems   Constitutional: Negative for chills and fever. HENT: Negative for congestion and rhinorrhea. Eyes: Negative for visual disturbance.    Respiratory: Negative for chest tightness and shortness of breath. Cardiovascular: Negative for chest pain and palpitations. Gastrointestinal: Negative for abdominal pain, diarrhea, nausea and vomiting. Genitourinary: Positive for pelvic pain and vaginal bleeding. Negative for difficulty urinating, dysuria, flank pain, frequency, hematuria, menstrual problem, urgency, vaginal discharge and vaginal pain. Musculoskeletal: Negative for back pain and neck pain. Skin: Negative for rash. Allergic/Immunologic: Negative for immunocompromised state. Neurological: Negative for dizziness, speech difficulty, weakness and headaches. Hematological: Negative for adenopathy. Psychiatric/Behavioral: Negative for dysphoric mood and suicidal ideas. Physical Exam   Physical Exam  Vitals and nursing note reviewed. Constitutional:       General: She is not in acute distress. Appearance: She is not ill-appearing or toxic-appearing. HENT:      Head: Normocephalic and atraumatic. Nose: Nose normal.      Mouth/Throat:      Mouth: Mucous membranes are moist.   Eyes:      Extraocular Movements: Extraocular movements intact. Pupils: Pupils are equal, round, and reactive to light. Cardiovascular:      Rate and Rhythm: Normal rate and regular rhythm. Pulses: Normal pulses. Pulmonary:      Effort: Pulmonary effort is normal.      Breath sounds: No stridor. No wheezing or rhonchi. Abdominal:      General: Abdomen is flat. There is no distension. Tenderness: There is abdominal tenderness in the suprapubic area. There is no right CVA tenderness, left CVA tenderness, guarding or rebound. Musculoskeletal:         General: Normal range of motion. Cervical back: Normal range of motion and neck supple. Skin:     General: Skin is warm and dry. Neurological:      General: No focal deficit present. Mental Status: She is alert and oriented to person, place, and time.    Psychiatric: Judgment: Judgment normal.         Diagnostic Study Results     Labs -   No results found for this or any previous visit (from the past 24 hour(s)). Radiologic Studies -   No orders to display     CT Results  (Last 48 hours)    None        CXR Results  (Last 48 hours)    None          Medical Decision Making   ISukhdev MD am the first provider for this patient, and I am the attending of record for this patient encounter. I reviewed the vital signs, available nursing notes, past medical history, past surgical history, family history and social history. Vital Signs-Reviewed the patient's vital signs. Patient Vitals for the past 24 hrs:   Temp Pulse Resp BP SpO2   10/23/21 1910 98.4 °F (36.9 °C) 70 16 (!) 132/91 96 %     Records Reviewed: Nursing Notes and Old Medical Records    Provider Notes (Medical Decision Making):   Differential diagnosis considered: Spontaneous miscarriage-threatened versus inevitable versus missed versus complete, heavy menstrual bleeding, painful menstrual cramps, etc. Considered but doubt ovarian pathology such as torsion, TOA; PID, UTI, pyelonephritis, acute surgical abdominal process. Vitals are stable. Patient appears well on exam, in no acute distress. Abdomen is soft, nondistended, with diffuse mild tenderness to palpation throughout the lower pelvic region. No peritoneal signs. No CVA tenderness bilaterally. We will check serum beta hCG. Will medicate patient with Tylenol for pain. Will obtain further labs and consider pelvic US if serum beta-hCG returns positive. ED Course as of Oct 23 2059   Sat Oct 23, 2021   2057 Serum quantitative Beta-hCG less than <1. Patient is not pregnant. Symptoms most likely secondary to withdrawal bleeding/menstrual period after recent cessation of hormonal birth control pills. Discussed with patient using NSAIDs/Tylenol for pelvic cramping. Bleeding precautions discussed.   Advised to follow-up with OB/GYN specialist. Discharged in stable condition with return precautions discussed. [JM]      ED Course User Index  [JM] Hermila Bloom MD       ED Course:   Initial assessment performed. The patient's presenting problems have been discussed, and they are in agreement with the care plan formulated and outlined with them. I have encouraged them to ask questions as they arise throughout their visit. Checo Dobson MD      Disposition:  Discharge      DISCHARGE PLAN:  1. Current Discharge Medication List        2. Follow-up Information     Follow up With Specialties Details Why Contact Info    Your PCP            3. Return to ED if worse     Diagnosis     Clinical Impression:   1. Menorrhagia with irregular cycle    2. Menstrual cramp      Attestations:    Checo Dobson MD    Please note that this dictation was completed with [employer], the computer voice recognition software. Quite often unanticipated grammatical, syntax, homophones, and other interpretive errors are inadvertently transcribed by the computer software. Please disregard these errors. Please excuse any errors that have escaped final proofreading. Thank you.

## 2021-10-24 NOTE — ED NOTES
Emergency Department Nursing Plan of Care       The Nursing Plan of Care is developed from the Nursing assessment and Emergency Department Attending provider initial evaluation. The plan of care may be reviewed in the ED Provider note.     The Plan of Care was developed with the following considerations:   Patient / Family readiness to learn indicated by:verbalized understanding  Persons(s) to be included in education: patient  Barriers to Learning/Limitations:No    Signed     Brigitte Bonner RN    10/23/2021   8:07 PM

## 2021-10-24 NOTE — DISCHARGE INSTRUCTIONS
It was a pleasure taking care of you in our Emergency Department today. We know that when you come to [facility], you are entrusting us with your health, comfort, and safety. Our physicians and nurses honor that trust, and truly appreciate the opportunity to care for you and your loved ones. We also value your feedback. If you receive a survey about your Emergency Department experience today, please fill it out. We care about our patients' feedback, and we listen to what you have to say. Thank you!     Royce Juan MD

## 2022-03-15 NOTE — ED TRIAGE NOTES
Behavioral Health Belongings     Informed of Valuables Policy  Yes No         Item                  Qty   Clothing:     Home   Bin   Security Lock Up Room Returned   Date/Initials   1 Pants with strings    X    4 Black shirt, red long sleeve, red sweater, black sweater, blue sweater  X      3 Underwear  X      2 Gold pant, blue pant  X            Behavioral Health Belongings List                     [id number]               Qty       Luggage / Bags:   Home   Bin   Security Lock Up Room Returned Date/Initials   2 Linen Bag    X        Qty   Grooming / Hygiene Items:   Home   Bin   Security Lock Up Room Returned Date/Initials   1 Lipstick    X            Behavioral Health Belongings List                          [id number]                                     Qty   Cards, Keys, Valuables:   Home   Bin   Security Lock Up Room Returned Date/Initials    Glasses with red case  X                          Behavioral Health Belongings List                            [id number]             Qty   Other Misc Items   Home   Bin   Security Lock Up Room Returned Date/Initials   1 Open bag of non perishable food with metal clip Per family verified by RN denver to throw away since not allowed       1 Silver bowl with what looks like butter Per family verified by RN denver to throw away since not allowed         Signature at Admission ___________________________________________Date: _________    Signature at Update ______________________________________________Date: _________    Signature at Discharge ____________________________________________Date: _________                                                                    Behavioral Health Belongings List                       [id number]          Pt reports abdominal cramping starting yesterday. Reports taking preg test yesterday am and test was positive. Reports around [phone] today went to bathroom and [address] rd blood clot came out about 1.5 in in diameter. Pt unsure if she is still bleeding but reports intermittent cramping.

## 2022-03-18 PROBLEM — G35 MS (MULTIPLE SCLEROSIS) (HCC): Status: ACTIVE | Noted: 2020-03-07

## 2022-03-18 PROBLEM — F10.90 CHRONIC ALCOHOL USE: Status: ACTIVE | Noted: 2020-03-07

## 2022-03-19 PROBLEM — E06.3 HYPOTHYROIDISM DUE TO HASHIMOTO'S THYROIDITIS: Status: ACTIVE | Noted: 2020-03-07

## 2022-03-19 PROBLEM — G35 MULTIPLE SCLEROSIS EXACERBATION (HCC): Status: ACTIVE | Noted: 2020-03-07

## 2022-03-19 PROBLEM — Z72.0 TOBACCO ABUSE DISORDER: Status: ACTIVE | Noted: 2020-03-07

## 2022-03-19 PROBLEM — E03.8 HYPOTHYROIDISM DUE TO HASHIMOTO'S THYROIDITIS: Status: ACTIVE | Noted: 2020-03-07
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
## 2022-03-19 PROBLEM — F14.90 COCAINE USE: Status: ACTIVE | Noted: 2020-03-08

## 2022-03-19 PROBLEM — D64.9 ANEMIA: Status: ACTIVE | Noted: 2020-03-07
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
## 2022-03-21 ENCOUNTER — NURSE TRIAGE (OUTPATIENT)
Dept: OTHER | Facility: CLINIC | Age: 37
End: 2022-03-21

## 2022-03-21 NOTE — TELEPHONE ENCOUNTER
Received call from Cleo at [facility], caller not on line. Complaint: history of MS and Graves. [employer] and hit head. Does not have medication for MS and unsure plan of care.      Practice Name: none    Caller's telephone number verified as [phone]    Unsuccessful attempt to re-connect with caller via phone, left message for return call to office    Reason for Disposition   Message left on identified voicemail    Protocols used: NO CONTACT OR DUPLICATE CONTACT CALL-ADULT-[place]

## 2022-04-27 ENCOUNTER — VIRTUAL VISIT (OUTPATIENT)
Dept: INTERNAL MEDICINE CLINIC | Age: 37
End: 2022-04-27
Payer: COMMERCIAL

## 2022-04-27 DIAGNOSIS — E55.9 VITAMIN D DEFICIENCY: ICD-10-CM

## 2022-04-27 DIAGNOSIS — H00.11 CHALAZION OF RIGHT UPPER EYELID: ICD-10-CM

## 2022-04-27 DIAGNOSIS — G43.019 INTRACTABLE MIGRAINE WITHOUT AURA AND WITHOUT STATUS MIGRAINOSUS: ICD-10-CM

## 2022-04-27 DIAGNOSIS — G35 MULTIPLE SCLEROSIS EXACERBATION (HCC): Primary | ICD-10-CM

## 2022-04-27 DIAGNOSIS — H01.001 BLEPHARITIS OF RIGHT UPPER EYELID, UNSPECIFIED TYPE: ICD-10-CM

## 2022-04-27 DIAGNOSIS — E03.4 HYPOTHYROIDISM DUE TO ACQUIRED ATROPHY OF THYROID: ICD-10-CM

## 2022-04-27 DIAGNOSIS — Z86.39 HISTORY OF GRAVES' DISEASE: ICD-10-CM

## 2022-04-27 DIAGNOSIS — M62.838 MUSCLE SPASM: ICD-10-CM

## 2022-04-27 DIAGNOSIS — Z76.89 ENCOUNTER TO ESTABLISH CARE: ICD-10-CM
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
## 2022-04-27 PROBLEM — F10.90 CHRONIC ALCOHOL USE: Status: RESOLVED | Noted: 2020-03-07 | Resolved: 2022-04-27
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
## 2022-04-27 PROBLEM — F14.90 COCAINE USE: Status: RESOLVED | Noted: 2020-03-08 | Resolved: 2022-04-27

## 2022-04-27 PROBLEM — F10.90 CHRONIC ALCOHOL USE: Chronic | Status: RESOLVED | Noted: 2020-03-07 | Resolved: 2022-04-27

## 2022-04-27 PROCEDURE — 99204 OFFICE O/P NEW MOD 45 MIN: CPT | Performed by: NURSE PRACTITIONER

## 2022-04-27 RX ORDER — ACETAMINOPHEN 500 MG
1000 TABLET ORAL
Qty: 100 TABLET | Refills: 0 | Status: SHIPPED | OUTPATIENT
Start: 2022-04-27 | End: 2022-06-23

## 2022-04-27 RX ORDER — NORTRIPTYLINE HYDROCHLORIDE 25 MG/1
25 CAPSULE ORAL
Qty: 30 CAPSULE | Refills: 2 | Status: SHIPPED | OUTPATIENT
Start: 2022-04-27 | End: 2022-07-06 | Stop reason: SDUPTHER

## 2022-04-27 RX ORDER — SUMATRIPTAN 100 MG/1
100 TABLET, FILM COATED ORAL
Qty: 9 TABLET | Refills: 2 | Status: SHIPPED | OUTPATIENT
Start: 2022-04-27 | End: 2022-07-06 | Stop reason: SDUPTHER

## 2022-04-27 RX ORDER — LANOLIN ALCOHOL/MO/W.PET/CERES
CREAM (GRAM) TOPICAL
COMMUNITY
Start: 2022-04-22

## 2022-04-27 RX ORDER — METHOCARBAMOL 750 MG/1
TABLET, FILM COATED ORAL
COMMUNITY
Start: 2022-04-22

## 2022-04-27 RX ORDER — DICLOFENAC SODIUM 75 MG/1
75 TABLET, DELAYED RELEASE ORAL 2 TIMES DAILY
Qty: 60 TABLET | Refills: 3 | Status: SHIPPED | OUTPATIENT
Start: 2022-04-27

## 2022-04-27 RX ORDER — TOPIRAMATE 25 MG/1
25 TABLET ORAL
Qty: 90 TABLET | Refills: 1 | Status: SHIPPED | OUTPATIENT
Start: 2022-04-27 | End: 2022-07-06

## 2022-04-27 RX ORDER — ERYTHROMYCIN 5 MG/G
OINTMENT OPHTHALMIC
Qty: 3.5 G | Refills: 0 | Status: SHIPPED | OUTPATIENT
Start: 2022-04-27

## 2022-04-27 NOTE — PROGRESS NOTES
Chief Complaint   Patient presents with    [facility]    Multiple Sclerosis    Eye Pain     painful knot on right eye x 2 months, pt has used warm compress and eye is draining    Other     text link [phone]   Pt rates eye pain 7/10     1. \"Have you been to the ER, urgent care clinic since your last visit? Hospitalized since your last visit? \" No    2. \"Have you seen or consulted any other health care providers outside of the [address] [place] [address] since your last visit? \" Yes When: last week Where: [facility] Reason for visit: med refill      3. For patients aged 39-70: Has the patient had a colonoscopy / FIT/ Cologuard? No      If the patient is female:    4. For patients aged 41-77: Has the patient had a mammogram within the past 2 years? No      5. For patients aged 21-65: Has the patient had a pap smear?  No

## 2022-04-27 NOTE — PROGRESS NOTES
Ivon Vargas is a 39 y.o. female new patient, here for evaluation of the following chief complaint(s):   Establish Care, Multiple Sclerosis, Eye Pain (painful knot on right eye x 2 months, pt has used warm compress and eye is draining), and Other (text link [phone])          Assessment & Plan:   Diagnoses and all orders for this visit:    1. Multiple sclerosis exacerbation ([address]  -     REFERRAL TO NEUROLOGY    2. Encounter to establish care    3. History of Graves' disease    4. Hypothyroidism due to acquired atrophy of thyroid    5. Intractable migraine without aura and without status migrainosus    6. Muscle spasm    7. Vitamin D deficiency    8. Blepharitis of right upper eyelid, unspecified type    9. Chalazion of right upper eyelid    Other orders  -     erythromycin (ILOTYCIN) ophthalmic ointment; Apply thin ribbon on pain every 6 hours until stye/chalazion clears. -     diclofenac EC (VOLTAREN) 75 mg EC tablet; Take 1 Tablet by mouth two (2) times a day. -     nortriptyline (PAMELOR) 25 mg capsule; Take 1 Capsule by mouth nightly. -     topiramate (TOPAMAX) 25 mg tablet; Take 1 Tablet by mouth nightly. -     acetaminophen (TYLENOL) 500 mg tablet; Take 2 Tablets by mouth three (3) times daily as needed for Pain (headache). With IMITREX  -     SUMAtriptan (IMITREX) 100 mg tablet; Take 1 Tablet by mouth daily as needed for Headache or Migraine. Follow-up and Dispositions    · Return in about 4 weeks (around 5/25/2022) for VV- Migraine/MS pain med start, lab rev, Chalazion fu. We discussed the expected course, resolution and complications of the diagnosis(es) in detail. Medication risks, benefits, costs, interactions, and alternatives were discussed as indicated. I advised her to contact the office if her condition worsens, changes or fails to improve as anticipated. She expressed understanding with the diagnosis(es) and plan.      Specific pt instructions until next visit: resumed several meds, deferred some while awaiting lab review. Referred BACK to [facility]UR[facility] for MS treatment. Started nortriptyline for chr pain and MIGRAINE prevention as well as resumed topamax 25 mg nightly. Abortive regimen resumed and diclofenac sent. INI will resume naproxen use as pt requested pending renal function. Subjective:   Erik Farmer is a 39 y.o. female who was seen for Establish Care, Multiple Sclerosis, Eye Pain (painful knot on right eye x 2 months, pt has used warm compress and eye is draining), and Other (text link [phone])      Pt here to establish care. Seen and treated for MS (since 2--5) in the past, seen by Dr. Arsen Sarmiento in the past, but last seen in . Lapse in care due to transportation. [facility] currently with use of muscle relaxers (Robaxin 750 mg TID) to help manage severe m. Spasm r/t MS. Also with h/o Grave's disease treated with radiation in ,  Acquired hypothyroidism on levothyroxine, and anemia. Had labs collected Friday. Would like Vitamin D, but unsure of last result, checking patient portal during visit. Pt presents with right eye knot after applying makeup for . Katerina Landaverde in [facility]. Associated with drainage, pain, and swelling. Tried warm compress. Has had in the past. Denies fever. Told former PCP and advised to see optometry. H/o migraine headaches, lasting x 1 week at times. Improved in the past with use topamax 25 mg. Having 21 HA days last month.        Patient Active Problem List    Diagnosis Date Noted    Intractable migraine without aura and without status migrainosus 2022    Muscle spasm 2022    Vitamin D deficiency 2022    MS (multiple sclerosis) ([facility] 2020    Multiple sclerosis exacerbation ([facility] 2020    Hypothyroidism due to acquired atrophy of thyroid 2020    Anemia 2020    Tobacco abuse disorder 2020    History of Graves' disease 2008     Current Outpatient Medications Medication Sig Dispense Refill    methocarbamoL (ROBAXIN) 750 mg tablet TAKE 2 TABLETS BY MOUTH 3 TIMES A DAY      ferrous sulfate 325 mg (65 mg iron) tablet TAKE 1 TABLET BY MOUTH EVERY OTHER DAY ON MONDAY, WEDNESDAY, AND FRIDAY WITH ORANGE JUICE      erythromycin (ILOTYCIN) ophthalmic ointment Apply thin ribbon on pain every 6 hours until stye/chalazion clears. 3.5 g 0    diclofenac EC (VOLTAREN) 75 mg EC tablet Take 1 Tablet by mouth two (2) times a day. 60 Tablet 3    nortriptyline (PAMELOR) 25 mg capsule Take 1 Capsule by mouth nightly. 30 Capsule 2    topiramate (TOPAMAX) 25 mg tablet Take 1 Tablet by mouth nightly. 90 Tablet 1    acetaminophen (TYLENOL) 500 mg tablet Take 2 Tablets by mouth three (3) times daily as needed for Pain (headache). With IMITREX 100 Tablet 0    SUMAtriptan (IMITREX) 100 mg tablet Take 1 Tablet by mouth daily as needed for Headache or Migraine. 9 Tablet 2    levothyroxine (SYNTHROID) 50 mcg tablet Take 1 Tab by mouth Daily (before breakfast). (Patient taking differently: Take 100 mcg by mouth Daily (before breakfast). ) 30 Tab 0    thiamine HCL (B-1) 100 mg tablet Take 1 Tab by mouth daily. (Patient not taking: Reported on 10/23/2021) 30 Tab 0    folic acid (FOLVITE) 1 mg tablet Take 1 Tab by mouth daily. (Patient not taking: Reported on 10/23/2021) 30 Tab 0    ergocalciferol (ERGOCALCIFEROL) 1,250 mcg (50,000 unit) capsule Take 1 Cap by mouth every seven (7) days.  (Patient not taking: Reported on 10/23/2021) 7 Cap 0     Allergies   Allergen Reactions    Onion Anaphylaxis    Raspberry Anaphylaxis    Caffeine Other (comments)     Reports \"bad shaking\"     Past Medical History:   Diagnosis Date    Anemia     Depression     Falls     Fatigue     Frequent headaches     Graves disease     Incontinence     Leg swelling     Memory disorder     Multiple sclerosis (HCC)     Muscle pain     Muscle weakness     Unintentional weight change      Past Surgical History: Procedure Laterality Date    HX  SECTION      x4 occurences    HX FREE SKIN GRAFT         Review of Systems   Constitutional: Negative for fever and malaise/fatigue. Eyes: Negative for blurred vision. Respiratory: Negative for cough and shortness of breath. Cardiovascular: Negative for chest pain and leg swelling. Neurological: Negative for dizziness, weakness and headaches. Objective:   Vital Signs: (As obtained by patient/caregiver at home)  There were no vitals taken for this visit. Physical Exam:  General appearance - alert, well  appearing, and in no distress. Speaking softly with raspy voice  Mental status - A/O x 4, sad mood and FLAT affect. Eyes- trace periorbital edema, drainage, or irritation noted. Nose- no obvious drainage or swelling. Throat- no obvious swelling, goiter, or notable lymphadenopathy  Chest - Symmetric chest rise. No wheezing or coughing. No distress. Skin- normal skin tone noted. No hyperpigmentation or obvious deformities. No diaphoresis noted. No flushing. Neuro - Normal speech, no focal findings or movement disorder. Other pertinent observable physical exam findings:-              Ivon Vargas is being evaluated by a Virtual Visit (video visit) encounter to address concerns as mentioned above. A caregiver was present when appropriate. Due to this being a TeleHealth encounter (During [address] emergency), evaluation of the following organ systems was limited: Vitals/Constitutional/EENT/Resp/CV/GI//MS/Neuro/Skin/Heme-Lymph-Imm. Pursuant to the emergency declaration under the [address] [place] [address] authority and the [employer] and [employer] Act, this Virtual Visit was conducted with patient's (and/or legal guardian's) consent, to reduce the patient's risk of exposure to COVID-19 and provide necessary medical care.   The patient (and/or legal guardian) has also been advised to contact this office for worsening conditions or problems, and seek emergency medical treatment and/or call 911 if deemed necessary. Patient identification was verified at the start of the visit: YES    Services were provided through a video synchronous discussion virtually to substitute for in-person clinic visit. Patient and provider were located at their individual homes. An electronic signature was used to authenticate this note.   -- Enid Cleveland NP

## 2022-04-27 NOTE — PATIENT INSTRUCTIONS
Multiple Sclerosis (MS): Care Instructions  Your Care Instructions  Multiple sclerosis, also called MS, is a disease that can affect the brain, spinal cord, and nerves to the eyes. MS can cause problems with muscle control and strength, vision, balance, feeling, and thinking. Whatever your symptoms are, taking medicine correctly and following your doctor's advice for home care can help you maintain your quality of life. Follow-up care is a key part of your treatment and safety. Be sure to make and go to all appointments, and call your doctor if you are having problems. It's also a good idea to know your test results and keep a list of the medicines you take. How can you care for yourself at home? General care  · Take your medicines exactly as prescribed. Call your doctor if you think you are having a problem with your medicine. · Use a cane, walker, or scooter if your doctor suggests it. · Keep doing your normal activities as much as you can. · If you have problems urinating, press or tap your bladder area to help start urine flow. If you have trouble controlling your urine, plan your fluid intake and activities so that a toilet will be available when you need it. · Spend time with family and friends. Join a support group for people with MS if you want extra help. · Depression is common with this condition. Tell your doctor if you have trouble sleeping, are eating too much or are not hungry, or feel sad or tearful all the time. Depression can be treated with medicine and counseling. Diet and exercise  · Eat a balanced diet. · If you have problems swallowing, change how and what you eat:  ? Try thick drinks, such as milk shakes. They are easier to swallow than other fluids. ? Do not eat foods that crumble easily. These can cause choking. ? Use a  to prepare food. Soft foods need less chewing. ? Eat small meals often so that you do not get tired from eating larger meals.   · Get exercise on most days. Work with your doctor to set up a program of walking, swimming, or other exercise that you are able to do. A physical therapist can teach you exercises if you cannot walk but can move your limbs and trunk. Or you can do exercises to help with coordination and balance. You can help improve muscle stiffness by doing exercises while lying in certain positions. When should you call for help? Call your doctor now or seek immediate medical care if:    · You have a change in symptoms.     · You fall or have another injury.     · You have symptoms of a urinary infection. For example:  ? You have blood or pus in your urine. ? You have pain in your back just below your rib cage. This is called flank pain. ? You have a fever, chills, or body aches. ? It hurts to urinate. ? You have groin or belly pain. Watch closely for changes in your health, and be sure to contact your doctor if:    · You want more information about MS or medicines.     · You have questions about alternative treatments. Do not use any other treatments without talking to your doctor first.   Where can you learn more? Go to http://www.gray.com/  Enter J407 in the search box to learn more about \"Multiple Sclerosis (MS): Care Instructions. \"  Current as of: December 13, 2021               Content Version: 13.2  © 2006-2022 [employer]. Care instructions adapted under license by [employer] (which disclaims liability or warranty for this information). If you have questions about a medical condition or this instruction, always ask your healthcare professional. [place] [address] any warranty or liability for your use of this information. Migraine Aura Without a Headache: Care Instructions  Overview     A migraine aura without a headache is a type of migraine. When you have an aura, you may see spots, wavy lines, or flashing lights.  Your hands, arms, or face may tingle or feel numb. But unlike other migraines, a headache doesn't follow the aura. Some people have both types of migraines. Although they sometimes have an aura without the headache, at other times they may get a headache after an aura. Without treatment, migraines can last from 4 hours to a few days. Medicines can help prevent migraines or stop them once they have started. Your doctor can help you find which ones work best for you. Follow-up care is a key part of your treatment and safety. Be sure to make and go to all appointments, and call your doctor if you are having problems. It's also a good idea to know your test results and keep a list of the medicines you take. How can you care for yourself at home? · Do not drive if you have taken a prescription pain medicine. · Rest in a quiet, dark room until your aura or headache is gone. Close your eyes. Try to relax or go to sleep. Don't watch TV or read. · If you get a headache, put a cold, moist cloth or cold pack on the painful area for 10 to 20 minutes at a time. Put a thin cloth between the cold pack and your skin. · Use a warm, moist towel or a heating pad set on low. This can relax tight shoulder and neck muscles. · Have someone gently massage your neck and shoulders. · Be safe with medicines. Take your medicines exactly as prescribed. Call your doctor if you think you are having a problem with your medicine. You will get more details on the specific medicines your doctor prescribes. · Don't take medicine for headache pain too often. Talk to your doctor if you are taking medicine more than 2 days a week to stop a headache. Taking too much pain medicine can lead to more headaches. These are called medicine-overuse headaches. To prevent migraines  · Keep a diary so you can figure out what triggers your auras or headaches. Avoiding triggers may help you prevent migraines.  Record when each aura or headache began, how long it lasted, and what the symptoms were like. Write down any other symptoms you had with the aura. These may include nausea or sensitivity to bright light or loud noise. Note if the aura or headache occurred near your period. List anything that might have triggered the aura. Triggers may include certain foods (chocolate, cheese, wine) or odors, smoke, bright light, stress, or lack of sleep. · If your doctor has prescribed medicine for your migraines, take it as directed. You may have medicine that you take only when you get a migraine and medicine that you take all the time to help prevent migraines. ? If your doctor has prescribed medicine for when you get migraines, take it at the first sign of an aura, unless your doctor has given you other instructions. ? If your doctor has prescribed medicine to prevent migraines, take it exactly as prescribed. Call your doctor if you think you are having a problem with your medicine. · Find healthy ways to deal with stress. Migraines are most common during or right after stressful times. Try finding ways to reduce stress like practicing mindfulness or deep breathing exercises. · Get plenty of sleep and exercise. But be careful to not push yourself too hard during exercise. It may trigger a headache. · Eat regular meals, and avoid foods and drinks that often trigger migraines. These include chocolate and alcohol, especially red wine and port. Chemicals used in food, such as aspartame and monosodium glutamate (MSG), also can trigger migraines. So can some food additives, such as those found in hot dogs, betts, cold cuts, aged cheeses, and pickled foods. · Limit caffeine by not drinking too much coffee, tea, or soda. But don't quit caffeine suddenly, because that can also give you migraines. · Do not smoke or allow others to smoke around you. If you need help quitting, talk to your doctor about stop-smoking programs and medicines. These can increase your chances of quitting for good.   · If you are taking birth control pills or hormone therapy, talk to your doctor about whether they are triggering your migraines. When should you call for help? Call 911 anytime you think you may need emergency care. For example, call if:    · You have symptoms of a stroke. These may include:  ? Sudden numbness, tingling, weakness, or loss of movement in your face, arm, or leg, especially on only one side of your body. ? Sudden vision changes. ? Sudden trouble speaking. ? Sudden confusion or trouble understanding simple statements. ? Sudden problems with walking or balance. ? A sudden, severe headache that is different from past headaches. Call your doctor now or seek immediate medical care if:    · You have new or worse nausea and vomiting.     · You have a new or higher fever.     · Your headache gets much worse. Watch closely for changes in your health, and be sure to contact your doctor if:    · You are not getting better after 2 days (48 hours). Where can you learn more? Go to http://www.gray.com/  Enter H931 in the search box to learn more about \"Migraine Aura Without a Headache: Care Instructions. \"  Current as of: December 13, 2021               Content Version: 13.2  © 9136-0775 [employer]. Care instructions adapted under license by [employer] (which disclaims liability or warranty for this information). If you have questions about a medical condition or this instruction, always ask your healthcare professional. [place] [address] any warranty or liability for your use of this information. Blepharitis: Care Instructions  Overview     Blepharitis is an inflammation or infection of the eyelids. It causes dry, scaly crusts on the eyelids. It can also cause your eyes to itch, burn, and look red. This problem is more common in people who have rosacea, dandruff, skin allergies, or eczema. Home treatment can help you keep your eyes comfortable.  Your doctor may also prescribe an ointment to put on your eyelids. Follow-up care is a key part of your treatment and safety. Be sure to make and go to all appointments, and call your doctor if you are having problems. It's also a good idea to know your test results and keep a list of the medicines you take. How can you care for yourself at home? · Wash your eyelids and eyebrows daily with baby shampoo. To wash your eyelids:  ? Place a warm, wet washcloth over your eyes for about a minute. This will help soften and loosen the crusts on your eyelashes. ? Put a few drops of baby shampoo on a warm washcloth. ? Gently wipe your eyelids and lashes. This helps remove any crust. It also cleans your eyelids. ? Rinse well with water. · Use artificial tears eyedrops if your eyes are dry. · Avoid wearing contact lenses or eye makeup while your eyelids are healing. · Be safe with medicines. If your doctor prescribed medicine for you, use it exactly as directed. Call your doctor if you think you are having a problem with your medicine. When should you call for help? Call your doctor now or seek immediate medical care if:    · You have signs of an eye infection, such as:  ? Pus or thick discharge coming from the eye.  ? Redness or swelling around the eye.  ? A fever. Watch closely for changes in your health, and be sure to contact your doctor if:    · You have vision changes.     · You do not get better as expected. Where can you learn more? Go to http://www.gray.com/  Enter A019 in the search box to learn more about \"Blepharitis: Care Instructions. \"  Current as of: January 24, 2022               Content Version: 13.2  © 5667-9409 [employer]. Care instructions adapted under license by [employer] (which disclaims liability or warranty for this information).  If you have questions about a medical condition or this instruction, always ask your healthcare professional. [employer]wise, [facility] disclaims any warranty or liability for your use of this information. Styes and Chalazia: Care Instructions  Overview     Styes and chalazia (say \"yqq-UMS-[id number]-\") are both conditions that can cause swelling of the eyelid. A stye is an infection in the root of an eyelash. The infection causes a tender red lump on the edge of the eyelid. The infection can spread until the whole eyelid becomes red and inflamed. Styes usually break open, and a tiny amount of pus drains. They usually clear up on their own in about a week, but they sometimes need treatment with antibiotics. A chalazion is a lump or cyst in the eyelid (chalazion is singular; chalazia is plural). It is caused by swelling and inflammation of deep oil glands inside the eyelid. Chalazia are usually not infected. They can take a few months to heal.  If a chalazion becomes more swollen and painful or does not go away, you may need to have it drained by your doctor. Follow-up care is a key part of your treatment and safety. Be sure to make and go to all appointments, and call your doctor if you are having problems. It's also a good idea to know your test results and keep a list of the medicines you take. How can you care for yourself at home? · Do not squeeze or try to open a stye or chalazion. · To help a stye or chalazion heal faster:  ? Put a warm, moist compress on your eye for 5 to 10 minutes, 3 to 6 times a day. Heat often brings a stye to a point where it drains on its own. Keep in mind that warm compresses will often increase swelling a little at first.  ? Do not use hot water or heat a wet cloth in a microwave oven. The compress may get too hot and can burn the eyelid. · If the doctor gave you antibiotic drops or ointment, use the medicine exactly as directed. Use the medicine for as long as instructed, even if your eye starts to feel better.   · To put in eyedrops or ointment:  ? Tilt your head back, and pull your lower eyelid down with one finger. ? Drop or squirt the medicine inside the lower lid. ? Close your eye for 30 to 60 seconds to let the drops or ointment move around. ? Do not touch the ointment or dropper tip to your eyelashes or any other surface. · Do not wear eye makeup or contact lenses until the stye or chalazion heals. · Do not share towels, pillows, or washcloths while you have a stye. What can you do to prevent styes and chalazia? Here are some things you can do to prevent styes and chalazia. · Don't rub your eyes. This can irritate your eyes and let in bacteria. If you need to touch your eyes, wash your hands first.  · Protect your eyes from dust and air pollution when you can. For example, wear safety glasses when you do dale chores like raking or mowing the lawn. · Remove eye makeup before you go to sleep. Replace eye makeup, especially mascara, at least every 6 months. Bacteria can grow in makeup. · If you get styes or chalazia often, wash your eyelids regularly with a little bit of baby shampoo mixed in warm water. · Treat any inflammation or infection of the eyelid promptly. When should you call for help? Call your doctor now or seek immediate medical care if:    · You have pain in your eye.     · You have a change in vision or loss of vision.     · Redness and swelling get much worse. Watch closely for changes in your health, and be sure to contact your doctor if:    · Your stye does not get better in 1 week.     · Your chalazion does not start to get better after several weeks. Where can you learn more? Go to http://www.gray.com/  Enter C853 in the search box to learn more about \"Styes and Chalazia: Care Instructions. \"  Current as of: January 24, 2022               Content Version: 13.2  © 9279-6365 [employer]. Care instructions adapted under license by [employer] (which disclaims liability or warranty for this information).  If you have questions about a medical condition or this instruction, always ask your healthcare professional. [place] [address] any warranty or liability for your use of this information.

## 2022-05-02 RX ORDER — SODIUM CHLORIDE 9 MG/ML
25 INJECTION, SOLUTION INTRAVENOUS CONTINUOUS
Status: DISPENSED | OUTPATIENT
Start: 2022-05-09 | End: 2022-05-09

## 2022-05-02 RX ORDER — ACETAMINOPHEN 325 MG/1
650 TABLET ORAL ONCE
Status: COMPLETED | OUTPATIENT
Start: 2022-05-09 | End: 2022-05-09

## 2022-05-09 ENCOUNTER — HOSPITAL ENCOUNTER (OUTPATIENT)
Dept: INFUSION THERAPY | Age: 37
Discharge: HOME | End: 2022-05-09
Payer: COMMERCIAL

## 2022-05-09 VITALS
BODY MASS INDEX: 27.87 KG/M2 | RESPIRATION RATE: 16 BRPM | WEIGHT: 147.6 LBS | HEIGHT: 61 IN | HEART RATE: 64 BPM | OXYGEN SATURATION: 100 % | SYSTOLIC BLOOD PRESSURE: 151 MMHG | TEMPERATURE: 98 F | DIASTOLIC BLOOD PRESSURE: 90 MMHG

## 2022-05-09 PROCEDURE — 74011250637 HC RX REV CODE- 250/637: Performed by: FAMILY MEDICINE

## 2022-05-09 PROCEDURE — 96367 TX/PROPH/DG ADDL SEQ IV INF: CPT

## 2022-05-09 PROCEDURE — 96366 THER/PROPH/DIAG IV INF ADDON: CPT

## 2022-05-09 PROCEDURE — 96365 THER/PROPH/DIAG IV INF INIT: CPT

## 2022-05-09 PROCEDURE — 74011250636 HC RX REV CODE- 250/636: Performed by: FAMILY MEDICINE

## 2022-05-09 PROCEDURE — 74011000258 HC RX REV CODE- 258: Performed by: FAMILY MEDICINE

## 2022-05-09 RX ADMIN — SODIUM CHLORIDE 25 MG: 9 INJECTION, SOLUTION INTRAVENOUS at 10:23

## 2022-05-09 RX ADMIN — ACETAMINOPHEN 650 MG: 325 TABLET ORAL at 09:23

## 2022-05-09 RX ADMIN — SODIUM CHLORIDE 25 ML/HR: 9 INJECTION, SOLUTION INTRAVENOUS at 10:18

## 2022-05-09 RX ADMIN — SODIUM CHLORIDE 975 MG: 9 INJECTION, SOLUTION INTRAVENOUS at 12:12

## 2022-05-09 NOTE — PROGRESS NOTES
[facility] Progress Note        Date: May 9, 2022    Name: Erik Farmer    MRN: [id number]         : 1985    0900  Ms. [address] Arrived ambulatory and in no distress for Infed Regimen. Assessment was completed, no acute issues at this time, no new complaints voiced. 24G PIV placed in left AC with positive blood return noted. 1. Do you have any symptoms of COVID-19? SOB, coughing, fever, or generally not feeling well NO    2. Have you been exposed to COVID-19 recently? NO    3. Have you had any recent contact with family/friend that has a pending COVID test? NO           Ms. Barba's vitals were reviewed. Patient Vitals for the past 12 hrs:   Temp Pulse Resp BP SpO2   22 1703 98 °F (36.7 °C) 64 16 (!) 151/90 100 %   22 0902 97.5 °F (36.4 °C) 73 16 (!) 128/93 100 %         Lab results were obtained and reviewed. No results found for this or any previous visit (from the past 12 hour(s)). Medications received:  Medications Administered     0.9% sodium chloride infusion     Admin Date  2022 Action  New Bag Dose  25 mL/hr Rate  25 mL/hr Route  IntraVENous Administered By  Mason Adams RN          acetaminophen (TYLENOL) tablet 650 mg     Admin Date  2022 Action  Given Dose  650 mg Route  Oral Administered By  Mason Adams RN          iron dextran (INFED) 25 mg in 0.9% sodium chloride 50 mL, overfill volume 5 mL ivpb     Admin Date  2022 Action  New Bag Dose  25 mg Route  IntraVENous Administered By  Mason Adams RN          iron dextran (INFED) 975 mg in 0.9% sodium chloride 500 mL, overfill volume 50 mL IVPB     Admin Date  2022 Action  New Bag Dose  975 mg Rate  142.4 mL/hr Route  IntraVENous Administered By  Mason Adams RN                 Ms. [address] tolerated treatment well and was discharged from [place] [address] in stable condition at 1710.   She politely declined to stay for 30 minute post infusion observation. RN gave patient written material on signs and symptoms of infusion reaction and when to call her provider, patient verbalized understanding.     Romelia Barbour RN  May 9, 2022    Future Appointments:  Future Appointments   Date Time Provider Pierre Verdugo   5/25/2022  1:45 PM Tania Villanueva NP [facility] BS AMB   6/7/2022 11:00 AM Macy Bateman NP [facility] BS AMB

## 2022-05-09 NOTE — PROGRESS NOTES
[facility]    DISCHARGE INSTRUCTIONS FOR:    IRON INFUSIONS - INCLUDING VENOFER, FERRLECIT, AND INFED    You should continue to take your usual home medications unless otherwise instructed by your physician. Drink plenty of fluids and eat your usual diet. All medications have the potential to cause side effects. Your physician can instruct you regarding any necessary treatment for side effects. Some possible side effects of iron infusions may include the following:     - Urinary changes;   - Mild muscle cramping;   - Mild nausea, stomach pain, diarrhea or constipation;   - Mild skin itching, mild pain at IV site. Signs/Symptoms of an allergic reaction may require immediate medical attention. These may include one or more of the following:       Skin redness, itching, swelling, blistering, weeping, crusting, rash or hives. Wheezing, chest tightness, cough, or shortness of breath;   Swelling of the face, eyelids, lips, tongue, or throat;  Severe headache, seizures or tremor;  Stuffy nose, runny nose, sneezing;   Red (bloodshot), itchy, swollen, or watery eyes;  Stomach  pain, nausea, vomiting, diarrhea or bloody diarrhea; Chest pain or tightness, increased heart rate, palpitations, changes in blood pressure which can cause dizziness, unusual feelings of weakness or fatigue;  Back ache or pain around waist;  Painful urination, increase or decrease in amount of urine, blood in urine. Contact your physicians office with any questions or concerns regarding your treatment.     Susan Marrero, Signature: _____________________________________ 5/9/2022  Checo Thompson RN

## 2022-06-23 RX ORDER — ACETAMINOPHEN 500 MG
1000 TABLET ORAL
Qty: 100 TABLET | Refills: 0 | Status: SHIPPED | OUTPATIENT
Start: 2022-06-23

## 2022-07-06 ENCOUNTER — VIRTUAL VISIT (OUTPATIENT)
Dept: INTERNAL MEDICINE CLINIC | Age: 37
End: 2022-07-06
Payer: COMMERCIAL

## 2022-07-06 DIAGNOSIS — E55.9 VITAMIN D DEFICIENCY: ICD-10-CM

## 2022-07-06 DIAGNOSIS — M79.674 PAIN AND SWELLING OF TOE OF RIGHT FOOT: ICD-10-CM

## 2022-07-06 DIAGNOSIS — M79.89 PAIN AND SWELLING OF TOE OF RIGHT FOOT: ICD-10-CM

## 2022-07-06 DIAGNOSIS — E03.4 HYPOTHYROIDISM DUE TO ACQUIRED ATROPHY OF THYROID: ICD-10-CM

## 2022-07-06 DIAGNOSIS — Z86.16 HISTORY OF COVID-19: ICD-10-CM

## 2022-07-06 DIAGNOSIS — Z11.59 NEED FOR HEPATITIS C SCREENING TEST: ICD-10-CM

## 2022-07-06 DIAGNOSIS — H00.11 CHALAZION OF RIGHT UPPER EYELID: ICD-10-CM

## 2022-07-06 DIAGNOSIS — G35 MULTIPLE SCLEROSIS EXACERBATION (HCC): ICD-10-CM

## 2022-07-06 DIAGNOSIS — G43.019 INTRACTABLE MIGRAINE WITHOUT AURA AND WITHOUT STATUS MIGRAINOSUS: Primary | ICD-10-CM

## 2022-07-06 DIAGNOSIS — S99.921A INJURY OF TOE ON RIGHT FOOT, INITIAL ENCOUNTER: ICD-10-CM

## 2022-07-06 DIAGNOSIS — M62.838 MUSCLE SPASM: ICD-10-CM

## 2022-07-06 DIAGNOSIS — Z86.39 HISTORY OF GRAVES' DISEASE: ICD-10-CM

## 2022-07-06 PROCEDURE — 99214 OFFICE O/P EST MOD 30 MIN: CPT | Performed by: NURSE PRACTITIONER

## 2022-07-06 RX ORDER — NORTRIPTYLINE HYDROCHLORIDE 25 MG/1
25 CAPSULE ORAL
Qty: 30 CAPSULE | Refills: 2 | Status: SHIPPED | OUTPATIENT
Start: 2022-07-06

## 2022-07-06 RX ORDER — TOPIRAMATE 25 MG/1
25 TABLET ORAL 2 TIMES DAILY WITH MEALS
Qty: 60 TABLET | Refills: 2 | Status: SHIPPED | OUTPATIENT
Start: 2022-07-06

## 2022-07-06 RX ORDER — SUMATRIPTAN 100 MG/1
100 TABLET, FILM COATED ORAL
Qty: 9 TABLET | Refills: 2 | Status: SHIPPED | OUTPATIENT
Start: 2022-07-06

## 2022-07-06 NOTE — PROGRESS NOTES
Pt is here for   Chief Complaint   Patient presents with    Follow-up     Migraine/MS pain med start, lab review, chalazion     1. Have you been to the ER, urgent care clinic since your last visit? Hospitalized since your last visit? No    2. Have you seen or consulted any other health care providers outside of the [address] [place] [address] since your last visit? Include any pap smears or colon screening.  No

## 2022-07-06 NOTE — PATIENT INSTRUCTIONS
[facility] [phone]         Gout: Care Instructions  Overview     Gout is a form of arthritis caused by a buildup of uric acid crystals in a joint. It causes sudden attacks of pain, swelling, redness, and stiffness, usually in one joint, especially the big toe. Gout usually comes on without a cause. But it can be brought on by drinking alcohol (especially beer), eating or drinking things made with high-fructose corn syrup, or eating seafood or red meat. Taking certain medicines, such as diuretics, can also trigger an attack of gout. Taking your medicines as prescribed and following up with your doctor regularly can help you avoid gout attacks in the future. Follow-up care is a key part of your treatment and safety. Be sure to make and go to all appointments, and call your doctor if you are having problems. It's also a good idea to know your test results and keep a list of the medicines you take. How can you care for yourself at home? · If the joint is swollen, put ice or a cold pack on the area for 10 to 20 minutes at a time. Put a thin cloth between the ice and your skin. · Prop up the sore limb on a pillow when you ice it or anytime you sit or lie down during the next 3 days. Try to keep it above the level of your heart. This can help reduce swelling. · Rest sore joints. Avoid activities that put weight or strain on the joints for a few days. Take short rest breaks from your regular activities during the day. · Take your medicines exactly as prescribed. Call your doctor if you think you are having a problem with your medicine. · Take pain medicines exactly as directed. ? If the doctor gave you a prescription medicine for pain, take it as prescribed. ? If you are not taking a prescription pain medicine, ask your doctor if you can take an over-the-counter medicine. · Eat less seafood and red meat. · Avoid foods or drinks that are made with high-fructose corn syrup.   · Check with your doctor before drinking alcohol. · Losing weight, if you are overweight, may help reduce attacks of gout. But do not go on a diet that causes rapid weight loss. Losing a lot of weight in a short amount of time can cause a gout attack. When should you call for help? Call your doctor now or seek immediate medical care if:    · You have a fever.     · The joint is so painful you cannot use it.     · You have sudden, unexplained swelling, redness, warmth, or severe pain in one or more joints. Watch closely for changes in your health, and be sure to contact your doctor if:    · You have joint pain.     · Your symptoms get worse or are not improving after 2 or 3 days. Where can you learn more? Go to http://adriana-zachary.info/  Enter E531 in the search box to learn more about \"Gout: Care Instructions. \"  Current as of: December 20, 2021               Content Version: 13.2  © 2006-2022 [employer]. Care instructions adapted under license by [employer] (which disclaims liability or warranty for this information). If you have questions about a medical condition or this instruction, always ask your healthcare professional. [place] [address] any warranty or liability for your use of this information. Broken Toe: Care Instructions  Your Care Instructions  You have broken (fractured) a bone in your toe. This kind of fracture does not need a special cast or brace. \"Benjy-taping\" your broken toe to a healthy toe next to it is almost always enough to treat the problem and ease symptoms. The toe may take 4 weeks or more to heal.  You heal best when you take good care of yourself. Eat a variety of healthy foods, and don't smoke. Follow-up care is a key part of your treatment and safety. Be sure to make and go to all appointments, and call your doctor if you are having problems.  It's also a good idea to know your test results and keep a list of the medicines you take. How can you care for yourself at home? · Be safe with medicines. Take pain medicines exactly as directed. ? If the doctor gave you a prescription medicine for pain, take it as prescribed. ? If you are not taking a prescription pain medicine, ask your doctor if you can take an over-the-counter medicine. · If your toe is taped to the toe next to it, your doctor has shown you how to change the tape. Protect the skin by putting something soft, such as felt or foam, between your toes before you tape them together. Never tape the toes together skin-to-skin. Your broken toe may need to be nikki-taped for 2 to 4 weeks to heal.  · Rest and protect your toe. Do not walk on it until you can do so without too much pain. If the doctor has told you to use crutches, use them as instructed. · Put ice or a cold pack on your toe for 10 to 20 minutes at a time. Try to do this every 1 to 2 hours for the next 3 days (when you are awake) or until the swelling goes down. Put a thin cloth between the ice and your skin. · Prop up your foot on a pillow when you ice it or anytime you sit or lie down. Try to keep it above the level of your heart. This will help reduce swelling. · Make sure you go to your follow-up appointments. Your doctor will need to check that your toe is healing right. When should you call for help? Call your doctor now or seek immediate medical care if:    · You have severe pain.     · Your toe is cool or pale or changes color.     · You have tingling, weakness, or numbness in your toe. Watch closely for changes in your health, and be sure to contact your doctor if:    · Pain and swelling get worse.     · You are not getting better as expected. Where can you learn more? Go to http://www.gray.com/  Enter [id number] in the search box to learn more about \"Broken Toe: Care Instructions. \"  Current as of: July 1, 2021               Content Version: 13.2  © 7993-4172 [employer]. Care instructions adapted under license by [employer] (which disclaims liability or warranty for this information). If you have questions about a medical condition or this instruction, always ask your healthcare professional. [address] any warranty or liability for your use of this information.

## 2022-07-06 NOTE — PROGRESS NOTES
Shun Rowe is a 39 y.o. female established patient, here for evaluation of the following chief complaint(s):   Follow-up (Migraine/MS pain med start, lab review, chalazion)          Assessment & Plan:   Diagnoses and all orders for this visit:    1. Intractable migraine without aura and without status migrainosus  -     SED RATE (ESR); Future  -     CBC WITH AUTOMATED DIFF; Future  -     METABOLIC PANEL, COMPREHENSIVE; Future  -     MAGNESIUM; Future  -     VITAMIN D, 25 HYDROXY; Future  -     TSH 3RD GENERATION; Future  -     nortriptyline (PAMELOR) 25 mg capsule; Take 1 Capsule by mouth nightly. -     topiramate (TOPAMAX) 25 mg tablet; Take 1 Tablet by mouth two (2) times daily (with meals). -     SUMAtriptan (IMITREX) 100 mg tablet; Take 1 Tablet by mouth daily as needed for Headache or Migraine. 2. Need for hepatitis C screening test  -     HCV RNA BY PCR W/REFL GENOTYPE; Future    3. Multiple sclerosis exacerbation (HCC)  -     nortriptyline (PAMELOR) 25 mg capsule; Take 1 Capsule by mouth nightly. -     DUSTIN BARR VIRUS AB PANEL; Future    4. History of Graves' disease  -     TSH 3RD GENERATION; Future    5. Hypothyroidism due to acquired atrophy of thyroid  -     TSH 3RD GENERATION; Future    6. Vitamin D deficiency  -     VITAMIN D, 25 HYDROXY; Future    7. Chalazion of right upper eyelid    8. Muscle spasm  -     CBC WITH AUTOMATED DIFF; Future  -     METABOLIC PANEL, COMPREHENSIVE; Future  -     VITAMIN D, 25 HYDROXY; Future  -     VITAMIN B12 & FOLATE; Future    9. History of COVID-19  -     DUSTIN BARR VIRUS AB PANEL; Future    10. Injury of toe on right foot, initial encounter    11. Pain and swelling of toe of right foot  -     URIC ACID; Future  -     SED RATE (ESR); Future  -     CBC WITH AUTOMATED DIFF; Future  -     HEMOGLOBIN A1C WITH EAG; Future  -     MAGNESIUM; Future  -     VITAMIN D, 25 HYDROXY; Future  -     VITAMIN B12 & FOLATE;  Future  -     TSH 3RD GENERATION; Future              Follow-up and Dispositions    · Return in about 4 weeks (around 8/3/2022) for VV- HA dose change/NEURO appt fu, lab review. Specific pt instructions until next visit: routine labs ordered, have labs drawn prior to ROV, call to reschedule appt with NEURO. INCREASED Topamax to BID use at 25 mg, continue TCA use also. Asked to keep HA log to review. DEFERRED imaging of right toe since time from injury ~ 6 weeks, no change to intervention, but labs to r/o inflammatory process or gout ordered. Referred to dispatch for UTI concern since unable to come to office for urine sample today. Subjective:   Andrea Vaughn is a 39 y.o. female who was seen for Follow-up (Migraine/MS pain med start, lab review, chalazion)      Pt presents to f/u referral to NEURO for MS, med start for related chr pain and HA with nortriptyline, and fu chalazion with optometry. Has NOT seen NEURO, missed appt due to urgent court date. Has not seen optometry for eye, has resolved with use of eye drops. Taking topamax, nortrityptline. Denies side effects from medication. Feels better for headaches, only 15 HA days, but also contracted COVID since last visit on 6/21. Had congestion, bodyaches, fever, and HA. Reports exacerbation to MS with more leg spasms and leg weakness. Also with report of hitting RIGHT pinky toe on coffee table. Incident happened in MAY. However still with swelling, increased tenderness, and pain.      Patient Active Problem List    Diagnosis Date Noted    Intractable migraine without aura and without status migrainosus 04/27/2022    Muscle spasm 04/27/2022    Vitamin D deficiency 04/27/2022    MS (multiple sclerosis) ([facility] [address] 03/07/2020    Multiple sclerosis exacerbation ([facility] [address] 03/07/2020    Hypothyroidism due to acquired atrophy of thyroid 03/07/2020    Anemia 03/07/2020    Tobacco abuse disorder 03/07/2020    History of Graves' disease 04/27/2008     Current Outpatient Medications Medication Sig Dispense Refill    nortriptyline (PAMELOR) 25 mg capsule Take 1 Capsule by mouth nightly. 30 Capsule 2    topiramate (TOPAMAX) 25 mg tablet Take 1 Tablet by mouth two (2) times daily (with meals). 60 Tablet 2    SUMAtriptan (IMITREX) 100 mg tablet Take 1 Tablet by mouth daily as needed for Headache or Migraine. 9 Tablet 2    acetaminophen (TYLENOL) 500 mg tablet TAKE 2 TABLETS BY MOUTH THREE (3) TIMES DAILY AS NEEDED FOR PAIN (HEADACHE). WITH IMITREX 100 Tablet 0    methocarbamoL (ROBAXIN) 750 mg tablet TAKE 2 TABLETS BY MOUTH 3 TIMES A DAY      ferrous sulfate 325 mg (65 mg iron) tablet TAKE 1 TABLET BY MOUTH EVERY OTHER DAY ON MONDAY, WEDNESDAY, AND FRIDAY WITH ORANGE JUICE      erythromycin (ILOTYCIN) ophthalmic ointment Apply thin ribbon on pain every 6 hours until stye/chalazion clears. 3.5 g 0    diclofenac EC (VOLTAREN) 75 mg EC tablet Take 1 Tablet by mouth two (2) times a day. 60 Tablet 3    levothyroxine (SYNTHROID) 50 mcg tablet Take 1 Tab by mouth Daily (before breakfast). (Patient not taking: Reported on 7/6/2022) 30 Tab 0    thiamine HCL (B-1) 100 mg tablet Take 1 Tab by mouth daily. (Patient not taking: Reported on 10/23/2021) 30 Tab 0    folic acid (FOLVITE) 1 mg tablet Take 1 Tab by mouth daily. (Patient not taking: Reported on 10/23/2021) 30 Tab 0    ergocalciferol (ERGOCALCIFEROL) 1,250 mcg (50,000 unit) capsule Take 1 Cap by mouth every seven (7) days.  (Patient not taking: Reported on 10/23/2021) 7 Cap 0     Allergies   Allergen Reactions    Onion Anaphylaxis    Raspberry Anaphylaxis    Caffeine Other (comments)     Reports \"bad shaking\"     Past Medical History:   Diagnosis Date    Anemia     Depression     Falls     Fatigue     Frequent headaches     Graves disease     Incontinence     Leg swelling     Memory disorder     Multiple sclerosis (HCC)     Muscle pain     Muscle weakness     Unintentional weight change      Past Surgical History: Procedure Laterality Date    HX  SECTION      x4 occurences    HX FREE SKIN GRAFT         Review of Systems   Constitutional: Negative for fever and malaise/fatigue. Eyes: Negative for blurred vision. Respiratory: Negative for cough and shortness of breath. Cardiovascular: Negative for chest pain and leg swelling. Neurological: Negative for dizziness, weakness and headaches. Objective:   Vital Signs: (As obtained by patient/caregiver at home)  There were no vitals taken for this visit. Physical Exam:  General appearance - alert, well appearing, and in no distress. Mental status - A/O x 4,normal mood and affect. Eyes- no periorbital edema, drainage, or irritation noted. Nose- no obvious drainage or swelling. Throat- no obvious swelling, goiter, or notable lymphadenopathy  Chest - Symmetric chest rise. No wheezing or coughing. No distress. Skin- normal skin tone noted. No hyperpigmentation or obvious deformities. No diaphoresis noted. No flushing. Neuro - Normal speech, no focal findings or movement disorder. Other pertinent observable physical exam findings:-        We discussed the expected course, resolution and complications of the diagnosis(es) in detail. Medication risks, benefits, costs, interactions, and alternatives were discussed as indicated. I advised her to contact the office if her condition worsens, changes or fails to improve as anticipated. She expressed understanding with the diagnosis(es) and plan. [facility], was evaluated through a synchronous (real-time) audio-video encounter. The patient (or guardian if applicable) is aware that this is a billable service, which includes applicable co-pays. This Virtual Visit was conducted with patient's (and/or legal guardian's) consent.  The visit was conducted pursuant to the emergency declaration under the [address] [facility] waiver authority and the Coronavirus Preparedness and Response Supplemental Appropriations Act. Patient identification was verified, and a caregiver was present when appropriate. The patient was located at: Home: [address] [facility]  The provider was located at: Home:    [place], [address] [facility] was used to authenticate this note.   -- Hemant Vidal, NP

## 2022-08-03 ENCOUNTER — VIRTUAL VISIT (OUTPATIENT)
Dept: INTERNAL MEDICINE CLINIC | Age: 37
End: 2022-08-03

## 2022-08-03 DIAGNOSIS — G35 MS (MULTIPLE SCLEROSIS) (HCC): ICD-10-CM

## 2022-08-03 DIAGNOSIS — G43.019 INTRACTABLE MIGRAINE WITHOUT AURA AND WITHOUT STATUS MIGRAINOSUS: Primary | ICD-10-CM

## 2022-08-03 DIAGNOSIS — Z86.16 HISTORY OF COVID-19: ICD-10-CM

## 2022-08-03 PROCEDURE — 99212 OFFICE O/P EST SF 10 MIN: CPT | Performed by: NURSE PRACTITIONER

## 2022-08-03 NOTE — PROGRESS NOTES
Shun Rowe is a 39 y.o. female established patient, here for evaluation of the following chief complaint(s):   Results (review), Medication Refill, and Other (Patient states she tested positive for covid 19 the last of June)          Assessment & Plan:   Diagnoses and all orders for this visit:    1. Intractable migraine without aura and without status migrainosus    2. History of COVID-19    3. MS (multiple sclerosis) ([facility] [address]            Follow-up and Dispositions    Return in about 3 months (around 11/3/2022) for OV- Neuro/HA fu, lab review. Specific pt instructions until next visit: continue present plan, have labs drawn prior to ROV, call NEURO and schedule visit please. Subjective:   Shun Rowe is a 39 y.o. female who was seen for Results (review), Medication Refill, and Other (Patient states she tested positive for covid 19 the last of June)      Pt presents to f/u HA dose change, appt with NEURO and lab review. However has NOT had labs collected yet due to being unsure where to have collected. Not able to be seen for UTI last visit since "[employer] does not accept her insurance. Has not had appt with NEURO, since has to set up transportation and appt was not \"until Sept\" on a Monday and she needs a wed or thurs appt. None available in Sept, so not scheduled at the time. Taking topomax BID. Denies side effects from medication. Feels better, only 3 HAs in the past month, and feels they are due primarily to the heat. No acute complaints otherwise. Also mentioned to nurse h/o COVID last summer.      Patient Active Problem List    Diagnosis Date Noted    History of COVID-19 08/03/2022    Intractable migraine without aura and without status migrainosus 04/27/2022    Muscle spasm 04/27/2022    Vitamin D deficiency 04/27/2022    MS (multiple sclerosis) ([facility] [address] 03/07/2020    Multiple sclerosis exacerbation ([facility] [address] 03/07/2020    Hypothyroidism due to acquired atrophy of thyroid 03/07/2020 Anemia 03/07/2020    Tobacco abuse disorder 03/07/2020    History of Graves' disease 04/27/2008     Current Outpatient Medications   Medication Sig Dispense Refill    nortriptyline (PAMELOR) 25 mg capsule Take 1 Capsule by mouth nightly. 30 Capsule 2    topiramate (TOPAMAX) 25 mg tablet Take 1 Tablet by mouth two (2) times daily (with meals). 60 Tablet 2    SUMAtriptan (IMITREX) 100 mg tablet Take 1 Tablet by mouth daily as needed for Headache or Migraine. 9 Tablet 2    acetaminophen (TYLENOL) 500 mg tablet TAKE 2 TABLETS BY MOUTH THREE (3) TIMES DAILY AS NEEDED FOR PAIN (HEADACHE). WITH IMITREX 100 Tablet 0    methocarbamoL (ROBAXIN) 750 mg tablet TAKE 2 TABLETS BY MOUTH 3 TIMES A DAY      ferrous sulfate 325 mg (65 mg iron) tablet TAKE 1 TABLET BY MOUTH EVERY OTHER DAY ON MONDAY, WEDNESDAY, AND FRIDAY WITH ORANGE JUICE      erythromycin (ILOTYCIN) ophthalmic ointment Apply thin ribbon on pain every 6 hours until stye/chalazion clears. 3.5 g 0    diclofenac EC (VOLTAREN) 75 mg EC tablet Take 1 Tablet by mouth two (2) times a day. 60 Tablet 3    levothyroxine (SYNTHROID) 50 mcg tablet Take 1 Tab by mouth Daily (before breakfast). (Patient not taking: No sig reported) 30 Tab 0    thiamine HCL (B-1) 100 mg tablet Take 1 Tab by mouth daily. (Patient not taking: No sig reported) 30 Tab 0    folic acid (FOLVITE) 1 mg tablet Take 1 Tab by mouth daily. (Patient not taking: No sig reported) 30 Tab 0    ergocalciferol (ERGOCALCIFEROL) 1,250 mcg (50,000 unit) capsule Take 1 Cap by mouth every seven (7) days.  (Patient not taking: No sig reported) 7 Cap 0     Allergies   Allergen Reactions    Onion Anaphylaxis    Raspberry Anaphylaxis    Caffeine Other (comments)     Reports \"bad shaking\"     Past Medical History:   Diagnosis Date    Anemia     Depression     Falls     Fatigue     Frequent headaches     Graves disease     Incontinence     Leg swelling     Memory disorder     Multiple sclerosis (HCC)     Muscle pain     Muscle weakness     Unintentional weight change      Past Surgical History:   Procedure Laterality Date    HX  SECTION      x4 occurences    HX FREE SKIN GRAFT         Review of Systems   Constitutional: Negative for fever and malaise/fatigue. Eyes: Negative for blurred vision. Respiratory: Negative for cough and shortness of breath. Cardiovascular: Negative for chest pain and leg swelling. Neurological: Negative for dizziness, weakness and headaches. Objective:   Vital Signs: (As obtained by patient/caregiver at home)  There were no vitals taken for this visit. Physical Exam:  General appearance - alert, well  appearing, and in no distress. Mental status - A/O x 4,normal mood and affect. Eyes- no periorbital edema, drainage, or irritation noted. Nose- no obvious drainage or swelling. Throat- no obvious swelling, goiter, or notable lymphadenopathy  Chest - Symmetric chest rise. No wheezing or coughing. No distress. Skin- normal skin tone noted. No hyperpigmentation or obvious deformities. No diaphoresis noted. No flushing. Neuro - Normal speech, no focal findings or movement disorder. Other pertinent observable physical exam findings:-        We discussed the expected course, resolution and complications of the diagnosis(es) in detail. Medication risks, benefits, costs, interactions, and alternatives were discussed as indicated. I advised her to contact the office if her condition worsens, changes or fails to improve as anticipated. She expressed understanding with the diagnosis(es) and plan. [facility], was evaluated through a synchronous (real-time) audio-video encounter. The patient (or guardian if applicable) is aware that this is a billable service, which includes applicable co-pays. This Virtual Visit was conducted with patient's (and/or legal guardian's) consent.  The visit was conducted pursuant to the emergency declaration under the [address] and the National Emergencies Act, [address] [facility] waiver authority and the [employer] General Act. Patient identification was verified, and a caregiver was present when appropriate. The patient was located at: Home: [address] [facility]  The provider was located at: Home: [unfilled]   in [place], [address] was used to authenticate this note.   -- Jason Neely NP

## 2022-08-03 NOTE — PROGRESS NOTES
1. Have you been to the ER, urgent care clinic since your last visit? Hospitalized since your last visit?no    2. Have you seen or consulted any other health care providers outside of the [address] [place] [address] since your last visit? Include any pap smears or colon screening.  No    Chief Complaint   Patient presents with    Results     review

## 2022-11-15 ENCOUNTER — TELEPHONE (OUTPATIENT)
Dept: NEUROLOGY | Age: 37
End: 2022-11-15

## 2022-12-16 ENCOUNTER — OFFICE VISIT (OUTPATIENT)
Dept: INTERNAL MEDICINE CLINIC | Age: 37
End: 2022-12-16

## 2022-12-16 VITALS
RESPIRATION RATE: 18 BRPM | TEMPERATURE: 97.2 F | SYSTOLIC BLOOD PRESSURE: 120 MMHG | BODY MASS INDEX: 27.34 KG/M2 | WEIGHT: 144.8 LBS | DIASTOLIC BLOOD PRESSURE: 86 MMHG | HEIGHT: 61 IN | HEART RATE: 88 BPM | OXYGEN SATURATION: 100 %

## 2022-12-16 DIAGNOSIS — Z11.59 NEED FOR HEPATITIS C SCREENING TEST: ICD-10-CM

## 2022-12-16 DIAGNOSIS — M62.838 MUSCLE SPASM: ICD-10-CM

## 2022-12-16 DIAGNOSIS — G35 MULTIPLE SCLEROSIS EXACERBATION (HCC): ICD-10-CM

## 2022-12-16 DIAGNOSIS — D50.8 IRON DEFICIENCY ANEMIA SECONDARY TO INADEQUATE DIETARY IRON INTAKE: ICD-10-CM

## 2022-12-16 DIAGNOSIS — Z32.00 POSSIBLE PREGNANCY: ICD-10-CM

## 2022-12-16 DIAGNOSIS — Z86.39 HISTORY OF GRAVES' DISEASE: ICD-10-CM

## 2022-12-16 DIAGNOSIS — E55.9 VITAMIN D DEFICIENCY: ICD-10-CM

## 2022-12-16 DIAGNOSIS — R10.9 FLANK PAIN: ICD-10-CM

## 2022-12-16 DIAGNOSIS — N30.01 ACUTE CYSTITIS WITH HEMATURIA: ICD-10-CM

## 2022-12-16 DIAGNOSIS — G43.019 INTRACTABLE MIGRAINE WITHOUT AURA AND WITHOUT STATUS MIGRAINOSUS: ICD-10-CM

## 2022-12-16 DIAGNOSIS — G43.019 INTRACTABLE MIGRAINE WITHOUT AURA AND WITHOUT STATUS MIGRAINOSUS: Primary | ICD-10-CM

## 2022-12-16 DIAGNOSIS — E03.4 HYPOTHYROIDISM DUE TO ACQUIRED ATROPHY OF THYROID: ICD-10-CM

## 2022-12-16 DIAGNOSIS — M79.89 PAIN AND SWELLING OF TOE OF RIGHT FOOT: ICD-10-CM

## 2022-12-16 DIAGNOSIS — Z86.16 HISTORY OF COVID-19: ICD-10-CM

## 2022-12-16 DIAGNOSIS — M79.674 PAIN AND SWELLING OF TOE OF RIGHT FOOT: ICD-10-CM

## 2022-12-16 LAB
BILIRUB UR QL STRIP: NEGATIVE
GLUCOSE UR-MCNC: NEGATIVE MG/DL
HCG URINE, QL. (POC): NEGATIVE
KETONES P FAST UR STRIP-MCNC: NEGATIVE MG/DL
PH UR STRIP: 7.5 [PH] (ref 4.6–8)
PROT UR QL STRIP: NEGATIVE
SP GR UR STRIP: 1.02 (ref 1–1.03)
UA UROBILINOGEN AMB POC: NORMAL (ref 0.2–1)
URINALYSIS CLARITY POC: CLEAR
URINALYSIS COLOR POC: YELLOW
URINE BLOOD POC: NORMAL
URINE LEUKOCYTES POC: NEGATIVE
URINE NITRITES POC: NEGATIVE
VALID INTERNAL CONTROL?: YES

## 2022-12-16 RX ORDER — DICLOFENAC SODIUM 75 MG/1
75 TABLET, DELAYED RELEASE ORAL
Qty: 60 TABLET | Refills: 3 | Status: SHIPPED | OUTPATIENT
Start: 2022-12-16

## 2022-12-16 RX ORDER — LANOLIN ALCOHOL/MO/W.PET/CERES
CREAM (GRAM) TOPICAL
Qty: 90 TABLET | Refills: 2 | Status: SHIPPED | OUTPATIENT
Start: 2022-12-16

## 2022-12-16 RX ORDER — TOPIRAMATE 25 MG/1
25 TABLET ORAL 2 TIMES DAILY WITH MEALS
Qty: 60 TABLET | Refills: 2 | Status: SHIPPED | OUTPATIENT
Start: 2022-12-16

## 2022-12-16 RX ORDER — NITROFURANTOIN 25; 75 MG/1; MG/1
100 CAPSULE ORAL 2 TIMES DAILY
Qty: 10 CAPSULE | Refills: 0 | Status: SHIPPED | OUTPATIENT
Start: 2022-12-16 | End: 2022-12-21

## 2022-12-16 RX ORDER — NORTRIPTYLINE HYDROCHLORIDE 25 MG/1
25 CAPSULE ORAL
Qty: 30 CAPSULE | Refills: 2 | Status: SHIPPED | OUTPATIENT
Start: 2022-12-16

## 2022-12-16 RX ORDER — METHOCARBAMOL 750 MG/1
1500 TABLET, FILM COATED ORAL 3 TIMES DAILY
Qty: 18 TABLET | Refills: 0 | Status: SHIPPED | OUTPATIENT
Start: 2022-12-16 | End: 2022-12-19

## 2022-12-16 RX ORDER — SUMATRIPTAN 100 MG/1
100 TABLET, FILM COATED ORAL
Qty: 9 TABLET | Refills: 2 | Status: SHIPPED | OUTPATIENT
Start: 2022-12-16

## 2022-12-16 NOTE — PROGRESS NOTES
Pt is here for   Chief Complaint   Patient presents with    Follow-up     Neuro/HA    Medication Refill     Pending     Menstrual Problem     3 cycles in Nov. States last time this happened she was pregnant and didn't know     Flank Pain     1. Have you been to the ER, urgent care clinic since your last visit? Hospitalized since your last visit? No    2. Have you seen or consulted any other health care providers outside of the [address] [place] [address] since your last visit? Include any pap smears or colon screening.  No    9/10 all over body pains

## 2022-12-16 NOTE — PROGRESS NOTES
Rosa Khan (: 1985) is a 39 y.o. female, established patient, here for evaluation of the following chief complaint(s):  Follow-up (Neuro/HA), Medication Refill (Pending ), Menstrual Problem (3 cycles in Nov. States last time this happened she was pregnant and didn't know ), and Flank Pain       ASSESSMENT/PLAN:  Below is the assessment and plan developed based on review of pertinent history, physical exam, labs, studies, and medications. 1. Intractable migraine without aura and without status migrainosus  -     topiramate (TOPAMAX) 25 mg tablet; Take 1 Tablet by mouth two (2) times daily (with meals). , Normal, Disp-60 Tablet, R-2  -     SUMAtriptan (IMITREX) 100 mg tablet; Take 1 Tablet by mouth daily as needed for Headache or Migraine., Normal, Disp-9 Tablet, R-2  -     nortriptyline (PAMELOR) 25 mg capsule; Take 1 Capsule by mouth nightly., Normal, Disp-30 Capsule, R-2  2. Multiple sclerosis exacerbation (HCC)  -     nortriptyline (PAMELOR) 25 mg capsule; Take 1 Capsule by mouth nightly., Normal, Disp-30 Capsule, R-2  3. Flank pain  -     diclofenac EC (VOLTAREN) 75 mg EC tablet; Take 1 Tablet by mouth two (2) times daily as needed for Pain. With food., Normal, Disp-60 Tablet, R-3  -     AMB POC URINALYSIS DIP STICK AUTO W/O MICRO  4. Possible pregnancy  -     AMB POC URINE PREGNANCY TEST, VISUAL COLOR COMPARISON  5. Acute cystitis with hematuria  -     CULTURE, URINE; Future  -     nitrofurantoin, macrocrystal-monohydrate, (Macrobid) 100 mg capsule; Take 1 Capsule by mouth two (2) times a day for 5 days. , Normal, Disp-10 Capsule, R-0  6. Muscle spasm  -     methocarbamoL (ROBAXIN) 750 mg tablet; Take 2 Tablets by mouth three (3) times daily for 3 days. , Normal, Disp-18 Tablet, R-0  7.  Iron deficiency anemia secondary to inadequate dietary iron intake  -     ferrous sulfate 325 mg (65 mg iron) tablet; TAKE 1 TABLET BY MOUTH EVERY OTHER DAY ON MONDAY, WEDNESDAY, AND FRIDAY WITH ORANGE JUICE, Normal, Disp-90 Tablet, R-2      Return in about 4 weeks (around 1/13/2023) for VV-4 wk LAB Review, HA/Neuro. Pt asked to complete follow by next visit: call if any problems, SENT FOR UC since UA + for bld. Treated with macrobid. Resumed topamax and nortriptyline. Asked to get labs ordered in July, slip reprinted for collection TODAY. SUBJECTIVE/OBJECTIVE:  HPI    Pt presents to f/u NEURO & HA. Missed neuro appt due to court for children. HAs have improved, now having 2-3 weekly down from daily. Taking imitrex, other meds ran out. Denies side effects from medication. Feels concerned for increased menses in NOV, since pregnant the last time this happened. Having right flank pain x a few days. No urinary frequency and urgency. Review of Systems  Constitutional: negative for fevers, chills, anorexia and weight loss  Respiratory:  negative for cough, hemoptysis, dyspnea, and wheezing  CV:   negative for chest pain, palpitations, and lower extremity edema  GI:   negative for nausea, vomiting, diarrhea, abdominal pain, and melena  Endo:               negative for polyuria,polydipsia,polyphagia, and heat intolerance  Genitourinary: negative for dysuria, retention, and hematuria  Integument:  negative for rash, ulcerations, and pruritus  Hematologic:  negative for easy bruising and bleeding  Musculoskel: negative for arthralgias, muscle weakness,and joint pain/swelling  Neurological:  negative for headaches, dizziness, vertigo,and memory/gait problems  Behavl/Psych: negative for feelings of anxiety, depression, suicide, and mood changes    Current Outpatient Medications   Medication Sig    topiramate (TOPAMAX) 25 mg tablet Take 1 Tablet by mouth two (2) times daily (with meals). SUMAtriptan (IMITREX) 100 mg tablet Take 1 Tablet by mouth daily as needed for Headache or Migraine. nortriptyline (PAMELOR) 25 mg capsule Take 1 Capsule by mouth nightly.     methocarbamoL (ROBAXIN) 750 mg tablet Take 2 Tablets by mouth three (3) times daily for 3 days. ferrous sulfate 325 mg (65 mg iron) tablet TAKE 1 TABLET BY MOUTH EVERY OTHER DAY ON MONDAY, WEDNESDAY, AND FRIDAY WITH ORANGE JUICE    diclofenac EC (VOLTAREN) 75 mg EC tablet Take 1 Tablet by mouth two (2) times daily as needed for Pain. With food. nitrofurantoin, macrocrystal-monohydrate, (Macrobid) 100 mg capsule Take 1 Capsule by mouth two (2) times a day for 5 days. acetaminophen (TYLENOL) 500 mg tablet TAKE 2 TABLETS BY MOUTH THREE (3) TIMES DAILY AS NEEDED FOR PAIN (HEADACHE). WITH IMITREX (Patient not taking: Reported on 12/16/2022)     No current facility-administered medications for this visit. Visit Vitals  /86 (BP 1 Location: Left upper arm, BP Patient Position: Sitting, BP Cuff Size: Large adult)   Pulse 88   Temp 97.2 °F (36.2 °C) (Temporal)   Resp 18   Ht 5' 1\" (1.549 m)   Wt 144 lb 12.8 oz (65.7 kg)   LMP 11/21/2022 (Exact Date) Comment: pt states that she had 3 cycles in Nov 2022   SpO2 100%   BMI 27.36 kg/m²       Wt Readings from Last 3 Encounters:   12/16/22 144 lb 12.8 oz (65.7 kg)   05/09/22 147 lb 9.6 oz (67 kg)   10/23/21 150 lb (68 kg)         Physical Exam:   General appearance - alert, well appearing, and in no distress. Mental status - A/O x 4, aloof mood and affect. Chest - CTA. Symmetric chest rise. No wheezing. No distress. Heart - Normal rate & rhythm. Normal S1 & S2. No MGR. Abdomen- Soft, round. Non-distended, NT. No pulsatile masses or hernias. No CVAT. Ext-  No pedal edema, clubbing, or cyanosis. Skin-Warm and dry. No hyperpigmentation, ulcerations, or suspicious lesions. Neuro - Normal speech, no focal findings or movement disorder. Normal strength, gait, and muscle tone.      Results for orders placed or performed in visit on 12/16/22   AMB POC URINE PREGNANCY TEST, VISUAL COLOR COMPARISON   Result Value Ref Range    VALID INTERNAL CONTROL POC Yes     HCG urine, Ql. (POC) Negative Negative   AMB POC URINALYSIS DIP STICK AUTO W/O MICRO   Result Value Ref Range    Color (UA POC) Yellow     Clarity (UA POC) Clear     Glucose (UA POC) Negative Negative    Bilirubin (UA POC) Negative Negative    Ketones (UA POC) Negative Negative    Specific gravity (UA POC) 1.020 1.001 - 1.035    Blood (UA POC) 1+ Negative    pH (UA POC) 7.5 4.6 - 8.0    Protein (UA POC) Negative Negative    Urobilinogen (UA POC) 0.2 mg/dL 0.2 - 1    Nitrites (UA POC) Negative Negative    Leukocyte esterase (UA POC) Negative Negative               An electronic signature was used to authenticate this note.   -- Dulce Lopez, NP

## 2022-12-17 LAB
25(OH)D3 SERPL-MCNC: 18.8 NG/ML (ref 30–100)
ALBUMIN SERPL-MCNC: 4.4 G/DL (ref 3.5–5)
ALBUMIN/GLOB SERPL: 1.2 {RATIO} (ref 1.1–2.2)
ALP SERPL-CCNC: 66 U/L (ref 45–117)
ALT SERPL-CCNC: 38 U/L (ref 12–78)
ANION GAP SERPL CALC-SCNC: 4 MMOL/L (ref 5–15)
AST SERPL-CCNC: 43 U/L (ref 15–37)
BASOPHILS # BLD: 0.1 K/UL (ref 0–0.1)
BASOPHILS NFR BLD: 1 % (ref 0–1)
BILIRUB SERPL-MCNC: 0.4 MG/DL (ref 0.2–1)
BUN SERPL-MCNC: 7 MG/DL (ref 6–20)
BUN/CREAT SERPL: 8 (ref 12–20)
CALCIUM SERPL-MCNC: 9.6 MG/DL (ref 8.5–10.1)
CHLORIDE SERPL-SCNC: 109 MMOL/L (ref 97–108)
CO2 SERPL-SCNC: 25 MMOL/L (ref 21–32)
CREAT SERPL-MCNC: 0.87 MG/DL (ref 0.55–1.02)
DIFFERENTIAL METHOD BLD: ABNORMAL
EOSINOPHIL # BLD: 0 K/UL (ref 0–0.4)
EOSINOPHIL NFR BLD: 0 % (ref 0–7)
ERYTHROCYTE [DISTWIDTH] IN BLOOD BY AUTOMATED COUNT: 12.8 % (ref 11.5–14.5)
ERYTHROCYTE [SEDIMENTATION RATE] IN BLOOD: 107 MM/HR (ref 0–20)
EST. AVERAGE GLUCOSE BLD GHB EST-MCNC: 88 MG/DL
FOLATE SERPL-MCNC: 7.2 NG/ML (ref 5–21)
GLOBULIN SER CALC-MCNC: 3.8 G/DL (ref 2–4)
GLUCOSE SERPL-MCNC: 83 MG/DL (ref 65–100)
HBA1C MFR BLD: 4.7 % (ref 4–5.6)
HCT VFR BLD AUTO: 34.5 % (ref 35–47)
HGB BLD-MCNC: 11.1 G/DL (ref 11.5–16)
IMM GRANULOCYTES # BLD AUTO: 0 K/UL (ref 0–0.04)
IMM GRANULOCYTES NFR BLD AUTO: 0 % (ref 0–0.5)
LYMPHOCYTES # BLD: 2.8 K/UL (ref 0.8–3.5)
LYMPHOCYTES NFR BLD: 30 % (ref 12–49)
MAGNESIUM SERPL-MCNC: 2.3 MG/DL (ref 1.6–2.4)
MCH RBC QN AUTO: 30.2 PG (ref 26–34)
MCHC RBC AUTO-ENTMCNC: 32.2 G/DL (ref 30–36.5)
MCV RBC AUTO: 93.8 FL (ref 80–99)
MONOCYTES # BLD: 0.6 K/UL (ref 0–1)
MONOCYTES NFR BLD: 6 % (ref 5–13)
NEUTS SEG # BLD: 5.8 K/UL (ref 1.8–8)
NEUTS SEG NFR BLD: 63 % (ref 32–75)
NRBC # BLD: 0 K/UL (ref 0–0.01)
NRBC BLD-RTO: 0 PER 100 WBC
PLATELET # BLD AUTO: 206 K/UL (ref 150–400)
PMV BLD AUTO: 11.5 FL (ref 8.9–12.9)
POTASSIUM SERPL-SCNC: 3.9 MMOL/L (ref 3.5–5.1)
PROT SERPL-MCNC: 8.2 G/DL (ref 6.4–8.2)
RBC # BLD AUTO: 3.68 M/UL (ref 3.8–5.2)
SODIUM SERPL-SCNC: 138 MMOL/L (ref 136–145)
TSH SERPL DL<=0.05 MIU/L-ACNC: 24.2 UIU/ML (ref 0.36–3.74)
URATE SERPL-MCNC: 2.9 MG/DL (ref 2.6–6)
VIT B12 SERPL-MCNC: 375 PG/ML (ref 193–986)
WBC # BLD AUTO: 9.2 K/UL (ref 3.6–11)

## 2022-12-18 LAB
HCV GENTYP SERPL NAA+PROBE: NORMAL
HCV RNA SERPL NAA+PROBE-ACNC: NORMAL IU/ML
HCV RNA SERPL NAA+PROBE-LOG IU: NORMAL LOG10 IU/ML
TEST INFORMATION, 550045: NORMAL

## 2022-12-19 DIAGNOSIS — E55.9 VITAMIN D DEFICIENCY: ICD-10-CM

## 2022-12-19 DIAGNOSIS — E03.4 HYPOTHYROIDISM DUE TO ACQUIRED ATROPHY OF THYROID: Primary | ICD-10-CM

## 2022-12-19 DIAGNOSIS — Z86.39 HISTORY OF GRAVES' DISEASE: ICD-10-CM

## 2022-12-19 RX ORDER — LEVOTHYROXINE SODIUM 50 UG/1
50 TABLET ORAL
Qty: 90 TABLET | Refills: 0 | Status: SHIPPED | OUTPATIENT
Start: 2022-12-19

## 2022-12-19 RX ORDER — ERGOCALCIFEROL 1.25 MG/1
50000 CAPSULE ORAL
Qty: 12 CAPSULE | Refills: 3 | Status: SHIPPED | OUTPATIENT
Start: 2022-12-19

## 2022-12-20 LAB
EBV NA IGG SER-ACNC: >600 U/ML (ref 0–17.9)
EBV VCA IGG SER-ACNC: >600 U/ML (ref 0–17.9)
EBV VCA IGM SER-ACNC: <36 U/ML (ref 0–35.9)
INTERPRETATION, 169995: ABNORMAL

## 2022-12-21 PROBLEM — B27.90 CHRONIC EBV INFECTION: Status: ACTIVE | Noted: 2022-12-21

## 2022-12-25 LAB
BACTERIA SPEC CULT: ABNORMAL
CC UR VC: ABNORMAL
SERVICE CMNT-IMP: ABNORMAL

## 2023-01-16 ENCOUNTER — TELEPHONE (OUTPATIENT)
Dept: INTERNAL MEDICINE CLINIC | Age: 38
End: 2023-01-16

## 2023-01-16 NOTE — TELEPHONE ENCOUNTER
Pt has an appointment scheduled with LAURA Villa on 1/18/2023, this can be addressed at that appointment

## 2023-01-16 NOTE — TELEPHONE ENCOUNTER
Pt called today stating she wants a letter stating she needs a service dog because she has multiple sclerosis.   Pt # [phone]

## 2023-01-18 ENCOUNTER — VIRTUAL VISIT (OUTPATIENT)
Dept: INTERNAL MEDICINE CLINIC | Age: 38
End: 2023-01-18

## 2023-01-18 DIAGNOSIS — Z91.199 NO-SHOW FOR APPOINTMENT: Primary | ICD-10-CM

## 2023-01-18 NOTE — PROGRESS NOTES
Pt sent 3 video visit invites to email and phone on file. No connection. Called # on file, no answer. LVM to connect ASAP to keep appt. Will see next pt. If still has not connected, will need to reschedule for OV alternatively.

## 2023-01-18 NOTE — PROGRESS NOTES
Pt is here for   Chief Complaint   Patient presents with    Follow-up     4 week lab review, HA/Neuro. . wants link sent to email     Letter     For service dog     1. \"Have you been to the ER, urgent care clinic since your last visit? Hospitalized since your last visit? \" No    2. \"Have you seen or consulted any other health care providers outside of the [address] [place] [address] since your last visit? \" No     3. For patients aged 39-70: Has the patient had a colonoscopy / FIT/ Cologuard? NA - based on age      If the patient is female:    4. For patients aged 41-77: Has the patient had a mammogram within the past 2 years? NA - based on age or sex      11. For patients aged 21-65: Has the patient had a pap smear?  No      Wants link sent to email

## 2023-01-23 ENCOUNTER — OFFICE VISIT (OUTPATIENT)
Dept: INTERNAL MEDICINE CLINIC | Age: 38
End: 2023-01-23

## 2023-01-23 VITALS
BODY MASS INDEX: 27.19 KG/M2 | RESPIRATION RATE: 18 BRPM | OXYGEN SATURATION: 97 % | HEIGHT: 61 IN | TEMPERATURE: 97.7 F | HEART RATE: 63 BPM | DIASTOLIC BLOOD PRESSURE: 87 MMHG | SYSTOLIC BLOOD PRESSURE: 125 MMHG | WEIGHT: 144 LBS

## 2023-01-23 DIAGNOSIS — E03.4 HYPOTHYROIDISM DUE TO ACQUIRED ATROPHY OF THYROID: ICD-10-CM

## 2023-01-23 DIAGNOSIS — G35 MULTIPLE SCLEROSIS EXACERBATION (HCC): Primary | ICD-10-CM

## 2023-01-23 DIAGNOSIS — R10.30 LOWER ABDOMINAL PAIN: ICD-10-CM

## 2023-01-23 DIAGNOSIS — R74.8 ELEVATED LIVER ENZYMES: ICD-10-CM

## 2023-01-23 DIAGNOSIS — R23.3 EASY BRUISING: Primary | ICD-10-CM

## 2023-01-23 DIAGNOSIS — G43.019 INTRACTABLE MIGRAINE WITHOUT AURA AND WITHOUT STATUS MIGRAINOSUS: ICD-10-CM

## 2023-01-23 DIAGNOSIS — G35 MULTIPLE SCLEROSIS EXACERBATION (HCC): ICD-10-CM

## 2023-01-23 DIAGNOSIS — R35.0 URINARY FREQUENCY: ICD-10-CM

## 2023-01-23 DIAGNOSIS — M62.838 MUSCLE SPASM: ICD-10-CM

## 2023-01-23 PROBLEM — F43.22 ADJUSTMENT DISORDER WITH ANXIETY: Status: ACTIVE | Noted: 2023-01-23

## 2023-01-23 LAB
BILIRUB UR QL STRIP: NEGATIVE
GLUCOSE UR-MCNC: NEGATIVE MG/DL
KETONES P FAST UR STRIP-MCNC: NEGATIVE MG/DL
PH UR STRIP: 6 [PH] (ref 4.6–8)
PROT UR QL STRIP: NORMAL
SP GR UR STRIP: 1.02 (ref 1–1.03)
UA UROBILINOGEN AMB POC: NORMAL (ref 0.2–1)
URINALYSIS CLARITY POC: CLEAR
URINALYSIS COLOR POC: NORMAL
URINE BLOOD POC: NORMAL
URINE LEUKOCYTES POC: NEGATIVE
URINE NITRITES POC: NEGATIVE

## 2023-01-23 PROCEDURE — 81001 URINALYSIS AUTO W/SCOPE: CPT | Performed by: NURSE PRACTITIONER

## 2023-01-23 PROCEDURE — 99214 OFFICE O/P EST MOD 30 MIN: CPT | Performed by: NURSE PRACTITIONER

## 2023-01-23 RX ORDER — TOPIRAMATE 50 MG/1
50 TABLET, FILM COATED ORAL 2 TIMES DAILY WITH MEALS
Qty: 180 TABLET | Refills: 1 | Status: SHIPPED | OUTPATIENT
Start: 2023-01-23

## 2023-01-23 RX ORDER — METHOCARBAMOL 750 MG/1
750 TABLET, FILM COATED ORAL 3 TIMES DAILY
Qty: 90 TABLET | Refills: 2 | Status: SHIPPED | OUTPATIENT
Start: 2023-01-23

## 2023-01-23 RX ORDER — CEPHALEXIN 500 MG/1
500 CAPSULE ORAL 2 TIMES DAILY
Qty: 14 CAPSULE | Refills: 0 | Status: SHIPPED | OUTPATIENT
Start: 2023-01-23 | End: 2023-01-30

## 2023-01-23 NOTE — Clinical Note
Afternoon doctors,  This young lady has MS with muscle cramping like flares and a recent complaint of bruising preceeded by \"knots\". This sounds unfamiliar to me for MS or any other condition to cause bruising, so I wanted to reach out to see if either of you had heard of this. I have already referred her for fu with you  for her MS, but was unsure if a referral to you Dr. Jairon Blackmon was needed. Her last CBC with diff was normal except her RBC and HGB, but had improved from the reading 2 years prior.     Thanks, [employer], FNP-C.

## 2023-01-23 NOTE — PROGRESS NOTES
Sean Esteban (: 1985) is a 40 y.o. female, established patient, here for evaluation of the following chief complaint(s):  Follow-up (Lab review, HA/NEURO), Letter (For service dog), and Spasms (Pt states that she has been having muscle spasms, states that it will turn into a knot, it will leave bruises once release )       ASSESSMENT/PLAN:  Below is the assessment and plan developed based on review of pertinent history, physical exam, labs, studies, and medications. 1. Multiple sclerosis exacerbation (HCC)  -     REFERRAL TO NEUROLOGY  -     SED RATE (ESR)  -     methocarbamoL (ROBAXIN) 750 mg tablet; Take 1 Tablet by mouth three (3) times daily. , Normal, Disp-90 Tablet, R-2  2. Lower abdominal pain  -     AMB POC URINALYSIS DIP STICK AUTO W/ MICRO  3. Urinary frequency  -     AMB POC URINALYSIS DIP STICK AUTO W/ MICRO  -     CULTURE, URINE; Future  -     cephALEXin (Keflex) 500 mg capsule; Take 1 Capsule by mouth two (2) times a day for 7 days. Indications: UTI, Normal, Disp-14 Capsule, R-0  4. Hypothyroidism due to acquired atrophy of thyroid  -     TSH 3RD GENERATION  5. Elevated liver enzymes  -     HEPATIC FUNCTION PANEL  6. Intractable migraine without aura and without status migrainosus  -     topiramate (TOPAMAX) 50 mg tablet; Take 1 Tablet by mouth two (2) times daily (with meals). , Normal, Disp-180 Tablet, R-1  7. Muscle spasm  -     methocarbamoL (ROBAXIN) 750 mg tablet; Take 1 Tablet by mouth three (3) times daily. , Normal, Disp-90 Tablet, R-2      Return for VV-4 wk thyr lab fu, NEURO/HA med change, bruising. Pt asked to complete follow by next visit: call if any problems, SENT FOR UC since UA + for bld. Treated with Keflex. Letters written per request. Referred to new NEURO. INCREASED topamax to 50 mg BID to lower HA occurrence. Repeat labs ordered. SUBJECTIVE/OBJECTIVE:  HPI    Pt presents to f/u HA. Continues having 2-3 HAs weekly. Taking topamax and nortriptyline.  Denies side effects from medication. Feels the same. Couldn't find the # to call and reschedule appt for NEURO. Currently with MS flare, but dog helps her to manage emotional stress of chronic condition. Taking NSAID near daily otherwise. Pt presents with muscle spasms. Started  since last visit. Associated with knots and bruising. Has had in the past related to MS flares. Denies fall or trauma. Also continues with lower abd pain and abnormal urine odor. Took meds as prescribed and improved some, but has not resolved. Drinks ~ 8 bottles of water daily. Thyroid Review:  Patient is seen for followup of thyroid dysfunction; hypothyroidism. Thyroid ROS: denies fatigue, weight changes, heat/cold intolerance, bowel/skin changes or CVS symptoms. Taking medication as prescribed, levothyroxine 50 mcg  Last TSH below  Lab Results   Component Value Date/Time    TSH 24.20 (H) 12/16/2022 10:47 AM         Review of Systems  Constitutional: negative for fevers, chills, anorexia and weight loss  Respiratory:  negative for cough, hemoptysis, dyspnea, and wheezing  CV:   negative for chest pain, palpitations, and lower extremity edema  GI:   negative for nausea, vomiting, diarrhea, abdominal pain, and melena  Endo:               negative for polydipsia,polyphagia, and heat intolerance  Genitourinary: negative for dysuria, retention, and hematuria  Integument:  negative for rash, ulcerations, and pruritus  Hematologic:  negative for bleeding  Musculoskel: negative for muscle weakness,and joint pain/swelling  Neurological:  negative for headaches, dizziness, vertigo,and memory/gait problems  Behavl/Psych: negative for feelings of  depression, suicide, and mood changes    Current Outpatient Medications   Medication Sig    topiramate (TOPAMAX) 50 mg tablet Take 1 Tablet by mouth two (2) times daily (with meals). methocarbamoL (ROBAXIN) 750 mg tablet Take 1 Tablet by mouth three (3) times daily.     cephALEXin (Keflex) 500 mg capsule Take 1 Capsule by mouth two (2) times a day for 7 days. Indications: UTI    levothyroxine (SYNTHROID) 50 mcg tablet Take 1 Tablet by mouth Daily (before breakfast). Indications: a condition with low thyroid hormone levels    ergocalciferol (ERGOCALCIFEROL) 1,250 mcg (50,000 unit) capsule Take 1 Capsule by mouth every seven (7) days. SUMAtriptan (IMITREX) 100 mg tablet Take 1 Tablet by mouth daily as needed for Headache or Migraine. nortriptyline (PAMELOR) 25 mg capsule Take 1 Capsule by mouth nightly. ferrous sulfate 325 mg (65 mg iron) tablet TAKE 1 TABLET BY MOUTH EVERY OTHER DAY ON MONDAY, WEDNESDAY, AND FRIDAY WITH ORANGE JUICE    diclofenac EC (VOLTAREN) 75 mg EC tablet Take 1 Tablet by mouth two (2) times daily as needed for Pain. With food. acetaminophen (TYLENOL) 500 mg tablet TAKE 2 TABLETS BY MOUTH THREE (3) TIMES DAILY AS NEEDED FOR PAIN (HEADACHE). WITH IMITREX (Patient not taking: No sig reported)     No current facility-administered medications for this visit. Visit Vitals  /87 (BP 1 Location: Left upper arm, BP Patient Position: Sitting, BP Cuff Size: Large adult)   Pulse 63   Temp 97.7 °F (36.5 °C) (Temporal)   Resp 18   Ht 5' 1\" (1.549 m)   Wt 144 lb (65.3 kg)   LMP 01/16/2023 (Approximate)   SpO2 97%   BMI 27.21 kg/m²       Wt Readings from Last 3 Encounters:   01/23/23 144 lb (65.3 kg)   12/16/22 144 lb 12.8 oz (65.7 kg)   05/09/22 147 lb 9.6 oz (67 kg)         Physical Exam:   General appearance - alert, well appearing, and in no distress. Mental status - A/O x 4, aloof mood and affect. Chest - CTA. Symmetric chest rise. No wheezing. No distress. Heart - Normal rate & rhythm. Normal S1 & S2. No MGR. Abdomen- Soft, round. Non-distended, NT. No pulsatile masses or hernias. No CVAT. Ext-  No pedal edema, clubbing, or cyanosis. Skin-Warm and dry. No hyperpigmentation, ulcerations, or suspicious lesions. +ecchymosis to bilateral upper arms and forearms.  No insertion sites or punctum noted. Neuro - Normal speech, no focal findings or movement disorder. Normal strength, gait, and muscle tone. An electronic signature was used to authenticate this note.   -- Chasity Press, NP

## 2023-01-23 NOTE — PROGRESS NOTES
Pt is here for   Chief Complaint   Patient presents with    Follow-up     Lab review, HA/NEURO    Letter     For service dog    Spasms     Pt states that she has been having muscle spasms, states that it will turn into a knot, it will leave bruises once release      1. Have you been to the ER, urgent care clinic since your last visit? Hospitalized since your last visit? No    2. Have you seen or consulted any other health care providers outside of the [address] [place] [address] since your last visit? Include any pap smears or colon screening.  No

## 2023-01-23 NOTE — PATIENT INSTRUCTIONS
Try to avoid taking NSAIDS (BC powder, Ibuprofen, Advill, Motrin, and Aleve) more than THREE TIMES weekly.

## 2023-01-23 NOTE — LETTER
Emotional Support Animal Letter      To Whom It May Concern:    Vicenta Kaur is currently under the care of, Abril Hamilton NP at [place] [address]. She 1985 has been by patient since April 2022. I am familiar with their history and functional limitations imposed by her disability. Vicenta Kaur meets the definition of disability under the Americans with Disability Act, the [address] due to diagnosis of suffering with MS and adjustment disorder with anxiety, which substantially limits her ability to cope, interact effectively with others, and manage stress/anxiety. In order to help alleviate these difficulties, and enhance her ability to live independently with full use and enjoyment of surroundings; I am prescribing an animal that will assist Vicenta Kaur in coping with her disability. This will help to improve social interactions, coping, and stress management. As I am familiar with literature to support the therapeutic benefits of assistive animals in persons with disabilities such as those experience by Vicenta Kaur. If you have any questions or concerns, regarding the above mentioned patient or reason for needing an emotional support animal. Please feel free to contact my office at [phone].         Sincerely,      Abril Hamilton NP

## 2023-01-24 DIAGNOSIS — E03.4 HYPOTHYROIDISM DUE TO ACQUIRED ATROPHY OF THYROID: Primary | ICD-10-CM

## 2023-01-24 LAB
ALBUMIN SERPL-MCNC: 4.8 G/DL (ref 3.8–4.8)
ALP SERPL-CCNC: 89 IU/L (ref 44–121)
ALT SERPL-CCNC: 105 IU/L (ref 0–32)
AST SERPL-CCNC: 133 IU/L (ref 0–40)
BILIRUB DIRECT SERPL-MCNC: 0.23 MG/DL (ref 0–0.4)
BILIRUB SERPL-MCNC: 0.9 MG/DL (ref 0–1.2)
ERYTHROCYTE [SEDIMENTATION RATE] IN BLOOD BY WESTERGREN METHOD: 3 MM/HR (ref 0–32)
PROT SERPL-MCNC: 7.5 G/DL (ref 6–8.5)
TSH SERPL DL<=0.005 MIU/L-ACNC: 31.3 UIU/ML (ref 0.45–4.5)

## 2023-01-24 RX ORDER — LEVOTHYROXINE SODIUM 75 UG/1
75 TABLET ORAL
Qty: 30 TABLET | Refills: 2 | Status: SHIPPED | OUTPATIENT
Start: 2023-01-24

## 2023-01-24 NOTE — PROGRESS NOTES
Oddly, your TSH is elevated. I am sending for a new dose of levothyroxine to help lower this value. We will repeat at your fu visit.

## 2023-03-08 ENCOUNTER — VIRTUAL VISIT (OUTPATIENT)
Dept: INTERNAL MEDICINE CLINIC | Age: 38
End: 2023-03-08
Payer: MEDICAID

## 2023-03-08 DIAGNOSIS — R23.3 EASY BRUISING: ICD-10-CM

## 2023-03-08 DIAGNOSIS — E03.4 HYPOTHYROIDISM DUE TO ACQUIRED ATROPHY OF THYROID: ICD-10-CM

## 2023-03-08 DIAGNOSIS — D50.8 IRON DEFICIENCY ANEMIA SECONDARY TO INADEQUATE DIETARY IRON INTAKE: Primary | ICD-10-CM

## 2023-03-08 PROCEDURE — 99214 OFFICE O/P EST MOD 30 MIN: CPT | Performed by: NURSE PRACTITIONER

## 2023-03-08 NOTE — PROGRESS NOTES
John Bocanegra is a 40 y.o. female established patient, here for evaluation of the following chief complaint(s):   Follow-up (4week thyroid lab, Neuro/HA med change, bruising)          Assessment & Plan:   Diagnoses and all orders for this visit:    1. Iron deficiency anemia secondary to inadequate dietary iron intake  -     REFERRAL TO HEMATOLOGY    2. Easy bruising  -     REFERRAL TO HEMATOLOGY    3. Hypothyroidism due to acquired atrophy of thyroid  -     TSH 3RD GENERATION; Future              Follow-up and Dispositions    Return for VV-4 wk repeat TSH, referral fu to [facility]/NEURO. Specific pt instructions until next visit: routine labs ordered, have labs drawn prior to ROV, check label for levothyroxine, should be 75 mcg, but if taking 25 mcg during last lab draw then only 50 mcg needed. Subjective:   John Bocanegra is a 40 y.o. female who was seen for Follow-up (4week thyroid lab, Neuro/HA med change, bruising)      Pt presents to f/u HA with topamax frequency change, appts with NEURO & [facility] for bruising, and repeat TSH. Taking levothyroxine ? (Mentions 25, but last prescribed 75) mcg (does not have bottle available to review during visit) and topamax 50 mg BID. Has NOT seen [facility], they do not evaluate bruising, referred to [facility] instead. Has no appt with NEURO, has NOT called yet. Denies side effects from medication. Feels better. Reports having 4 HAs monthly now.   Lab Results   Component Value Date/Time    TSH 31.300 (H) 01/23/2023 03:19 PM       Patient Active Problem List    Diagnosis Date Noted    Adjustment disorder with anxiety 01/23/2023    Chronic EBV infection 12/21/2022    History of COVID-19 08/03/2022    Intractable migraine without aura and without status migrainosus 04/27/2022    Muscle spasm 04/27/2022    Vitamin D deficiency 04/27/2022    MS (multiple sclerosis) ([facility] 03/07/2020    Multiple sclerosis exacerbation ([facility] 03/07/2020    Hypothyroidism due to acquired atrophy of thyroid 2020    Anemia 2020    Tobacco abuse disorder 2020    History of Graves' disease 2008     Current Outpatient Medications   Medication Sig Dispense Refill    levothyroxine (SYNTHROID) 75 mcg tablet Take 1 Tablet by mouth Daily (before breakfast). Indications: a condition with low thyroid hormone levels 30 Tablet 2    topiramate (TOPAMAX) 50 mg tablet Take 1 Tablet by mouth two (2) times daily (with meals). 180 Tablet 1    methocarbamoL (ROBAXIN) 750 mg tablet Take 1 Tablet by mouth three (3) times daily. 90 Tablet 2    ergocalciferol (ERGOCALCIFEROL) 1,250 mcg (50,000 unit) capsule Take 1 Capsule by mouth every seven (7) days. 12 Capsule 3    SUMAtriptan (IMITREX) 100 mg tablet Take 1 Tablet by mouth daily as needed for Headache or Migraine. 9 Tablet 2    nortriptyline (PAMELOR) 25 mg capsule Take 1 Capsule by mouth nightly. 30 Capsule 2    ferrous sulfate 325 mg (65 mg iron) tablet TAKE 1 TABLET BY MOUTH EVERY OTHER DAY ON MONDAY, WEDNESDAY, AND FRIDAY WITH ORANGE JUICE 90 Tablet 2    diclofenac EC (VOLTAREN) 75 mg EC tablet Take 1 Tablet by mouth two (2) times daily as needed for Pain. With food. 60 Tablet 3    acetaminophen (TYLENOL) 500 mg tablet TAKE 2 TABLETS BY MOUTH THREE (3) TIMES DAILY AS NEEDED FOR PAIN (HEADACHE).  WITH IMITREX (Patient not taking: No sig reported) 100 Tablet 0     Allergies   Allergen Reactions    Onion Anaphylaxis    Raspberry Anaphylaxis    Caffeine Other (comments)     Reports \"bad shaking\"     Past Medical History:   Diagnosis Date    Anemia     Depression     Falls     Fatigue     Frequent headaches     Graves disease     Incontinence     Leg swelling     Memory disorder     Multiple sclerosis (HCC)     Muscle pain     Muscle weakness     Unintentional weight change      Past Surgical History:   Procedure Laterality Date    HX  SECTION      x4 occurences    HX FREE SKIN GRAFT         Review of Systems   Constitutional: Negative for fever and malaise/fatigue. Eyes: Negative for blurred vision. Respiratory: Negative for cough and shortness of breath. Cardiovascular: Negative for chest pain and leg swelling. Neurological: Negative for dizziness, weakness and headaches. Objective:   Vital Signs: (As obtained by patient/caregiver at home)  There were no vitals taken for this visit. Physical Exam:  General appearance - alert, well  appearing, and in no distress. Mental status - A/O x 4,normal mood and affect. Eyes- no periorbital edema, drainage, or irritation noted. Nose- no obvious drainage or swelling. Throat- no obvious swelling, goiter, or notable lymphadenopathy  Chest - Symmetric chest rise. No wheezing or coughing. No distress. Skin- normal skin tone noted. No hyperpigmentation or obvious deformities. No diaphoresis noted. No flushing. Neuro - Normal speech, no focal findings or movement disorder. Other pertinent observable physical exam findings:-        We discussed the expected course, resolution and complications of the diagnosis(es) in detail. Medication risks, benefits, costs, interactions, and alternatives were discussed as indicated. I advised her to contact the office if her condition worsens, changes or fails to improve as anticipated. She expressed understanding with the diagnosis(es) and plan. [facility], was evaluated through a synchronous (real-time) audio-video encounter. The patient (or guardian if applicable) is aware that this is a billable service, which includes applicable co-pays. This Virtual Visit was conducted with patient's (and/or legal guardian's) consent. The visit was conducted pursuant to the emergency declaration under the [address] [place] [address] [facility] authority and the [employer] and [employer] General Act. Patient identification was verified, and a caregiver was present when appropriate.   The patient was located at: Home: [id number] [employer]  The provider was located at: Home: [place]   [place]      An [address] [facility] was used to authenticate this note.   -- Rashmi Mtz NP

## 2023-03-08 NOTE — PATIENT INSTRUCTIONS
Please check your LEVOTHYROXINE label, if you are only taking 25 mcg, then call the pharmacy to see if the 50 mcg Rx is still available to fill and start that. If you ARE taking the 75 mcg as last prescribed, then report to have the lab done as ordered please to see if any further adjustments need to be made. I expect you to have called for appts to see both HEMATOLOGY for the bruising and NEUROLOGY for your MS by your next visit. It is okay if you have not been seen yet, but ideally you will at least have upcoming appts.

## 2023-03-08 NOTE — PROGRESS NOTES
Pt is here for   Chief Complaint   Patient presents with    Follow-up     4week thyroid lab, Neuro/HA med change, bruising     1. \"Have you been to the ER, urgent care clinic since your last visit? Hospitalized since your last visit? \" No    2. \"Have you seen or consulted any other health care providers outside of the [address] [place] [address] since your last visit? \" No     3. For patients aged 39-70: Has the patient had a colonoscopy / FIT/ Cologuard? NA - based on age      If the patient is female:    4. For patients aged 41-77: Has the patient had a mammogram within the past 2 years? NA - based on age or sex      11. For patients aged 21-65: Has the patient had a pap smear?  No

## 2023-05-01 ENCOUNTER — VIRTUAL VISIT (OUTPATIENT)
Dept: INTERNAL MEDICINE CLINIC | Age: 38
End: 2023-05-01
Payer: COMMERCIAL

## 2023-05-01 DIAGNOSIS — G35 MS (MULTIPLE SCLEROSIS) (HCC): ICD-10-CM

## 2023-05-01 DIAGNOSIS — M25.471 PAIN AND SWELLING OF RIGHT ANKLE: ICD-10-CM

## 2023-05-01 DIAGNOSIS — E03.4 HYPOTHYROIDISM DUE TO ACQUIRED ATROPHY OF THYROID: ICD-10-CM

## 2023-05-01 DIAGNOSIS — S93.401A SPRAIN OF RIGHT ANKLE, UNSPECIFIED LIGAMENT, INITIAL ENCOUNTER: Primary | ICD-10-CM

## 2023-05-01 DIAGNOSIS — D50.8 IRON DEFICIENCY ANEMIA SECONDARY TO INADEQUATE DIETARY IRON INTAKE: ICD-10-CM

## 2023-05-01 DIAGNOSIS — R23.3 EASY BRUISING: ICD-10-CM

## 2023-05-01 DIAGNOSIS — M25.571 PAIN AND SWELLING OF RIGHT ANKLE: ICD-10-CM

## 2023-05-01 DIAGNOSIS — G43.019 INTRACTABLE MIGRAINE WITHOUT AURA AND WITHOUT STATUS MIGRAINOSUS: ICD-10-CM

## 2023-05-01 PROCEDURE — 99214 OFFICE O/P EST MOD 30 MIN: CPT | Performed by: NURSE PRACTITIONER

## 2023-05-01 RX ORDER — DICLOFENAC SODIUM 75 MG/1
75 TABLET, DELAYED RELEASE ORAL
Qty: 180 TABLET | Refills: 3 | Status: SHIPPED | OUTPATIENT
Start: 2023-05-01

## 2023-05-01 RX ORDER — PREDNISONE 20 MG/1
40 TABLET ORAL
Qty: 10 TABLET | Refills: 0 | Status: SHIPPED | OUTPATIENT
Start: 2023-05-01

## 2023-05-01 RX ORDER — NORTRIPTYLINE HYDROCHLORIDE 25 MG/1
25 CAPSULE ORAL
Qty: 90 CAPSULE | Refills: 3 | Status: SHIPPED | OUTPATIENT
Start: 2023-05-01

## 2023-05-01 RX ORDER — ACETAMINOPHEN 500 MG
1000 TABLET ORAL
Qty: 180 TABLET | Refills: 2 | Status: SHIPPED | OUTPATIENT
Start: 2023-05-01

## 2023-05-01 NOTE — PROGRESS NOTES
Juliette Corbett is a 40 y.o. female established patient, here for evaluation of the following chief complaint(s):   Headache and Ankle Pain (Approx 4 weeks ago; injury; seems not getting better)          Assessment & Plan:   Diagnoses and all orders for this visit:    1. Sprain of right ankle, unspecified ligament, initial encounter  -     REFERRAL TO ORTHOPEDICS  -     XR ANKLE RT MIN 3 V; Future  -     predniSONE (DELTASONE) 20 mg tablet; Take 2 Tablets by mouth daily (with breakfast). 2. Pain and swelling of right ankle  -     REFERRAL TO ORTHOPEDICS  -     XR ANKLE RT MIN 3 V; Future  -     predniSONE (DELTASONE) 20 mg tablet; Take 2 Tablets by mouth daily (with breakfast). 3. Iron deficiency anemia secondary to inadequate dietary iron intake    4. Easy bruising    5. Hypothyroidism due to acquired atrophy of thyroid  -     TSH 3RD GENERATION; Future  -     T3 TOTAL; Future  -     T4, FREE; Future    6. Intractable migraine without aura and without status migrainosus  -     nortriptyline (PAMELOR) 25 mg capsule; Take 1 Capsule by mouth nightly. -     diclofenac EC (VOLTAREN) 75 mg EC tablet; Take 1 Tablet by mouth two (2) times daily as needed for Pain. With food. -     acetaminophen (TYLENOL) 500 mg tablet; Take 2 Tablets by mouth three (3) times daily as needed for Pain (headache). With IMITREX    7. MS (multiple sclerosis) ([facility] [address]  -     nortriptyline (PAMELOR) 25 mg capsule; Take 1 Capsule by mouth nightly. -     diclofenac EC (VOLTAREN) 75 mg EC tablet; Take 1 Tablet by mouth two (2) times daily as needed for Pain. With food. -     acetaminophen (TYLENOL) 500 mg tablet; Take 2 Tablets by mouth three (3) times daily as needed for Pain (headache). With IMITREX                Follow-up and Dispositions    Return in about 3 months (around 8/1/2023) for OV- migraine, HEME/NEURO appt, thyroid function, ORTHO/Ankle fu.            Specific pt instructions until next visit: referred to [facility], told to wear boot until seen. Prednisone pulse ordered. Labs for thyroid function, may need to lower dose with hyperactive thyroid symptoms reported. Asked to call for appt with both NEURO and HEME by next visit otherwise. Subjective:   Jony Zabala is a 40 y.o. female who was seen for Headache and Ankle Pain (Approx 4 weeks ago; injury; seems not getting better)      Pt presents to f/u HA and referrals to HEME and NEURO. Tried to call for an appt with HEME, but told they did not have any appts at the time and to call back. Had appt with NEURO, but missed it, due to uncle passing. Taking topamax BID. Denies side effects from medication. Feels better with HA, but has one this morning. No acute complaints otherwise. Pt presents with RIGHT ankle pain. Started ~ 1 month ago while coming down stairs. Associated with swelling, improved initially while wearing boot, but returned after no longer wearing boot. Tried wearing boot as given at [facility] with initial injury. Told she has a hairline fracture and ankle sprain. Unsure if told to fu with ORTHO or not. Also tried Tylenol without relief. Has not had in the past. Denies numbness, tingling, or repeat injury. Thyroid Review:  Patient is seen for followup of thyroid dysfunction; hypothyroidism. Thyroid ROS: denies fatigue, heat/cold intolerance, or bowel/skin changes. + lost 5-10 lbs and some palpitations.   Taking medication as prescribed, levothyroxine 75 mcg  Last TSH below  Lab Results   Component Value Date/Time    TSH 31.300 (H) 01/23/2023 03:19 PM         Patient Active Problem List    Diagnosis Date Noted    Adjustment disorder with anxiety 01/23/2023    Chronic EBV infection 12/21/2022    History of COVID-19 08/03/2022    Intractable migraine without aura and without status migrainosus 04/27/2022    Muscle spasm 04/27/2022    Vitamin D deficiency 04/27/2022    MS (multiple sclerosis) ([facility] [address] 03/07/2020    Multiple sclerosis exacerbation ([facility] 03/07/2020 Hypothyroidism due to acquired atrophy of thyroid 2020    Anemia 2020    Tobacco abuse disorder 2020    History of Graves' disease 2008     Current Outpatient Medications   Medication Sig Dispense Refill    predniSONE (DELTASONE) 20 mg tablet Take 2 Tablets by mouth daily (with breakfast). 10 Tablet 0    nortriptyline (PAMELOR) 25 mg capsule Take 1 Capsule by mouth nightly. 90 Capsule 3    diclofenac EC (VOLTAREN) 75 mg EC tablet Take 1 Tablet by mouth two (2) times daily as needed for Pain. With food. 180 Tablet 3    acetaminophen (TYLENOL) 500 mg tablet Take 2 Tablets by mouth three (3) times daily as needed for Pain (headache). With IMITREX 180 Tablet 2    levothyroxine (SYNTHROID) 75 mcg tablet Take 1 Tablet by mouth Daily (before breakfast). Indications: a condition with low thyroid hormone levels 30 Tablet 2    topiramate (TOPAMAX) 50 mg tablet Take 1 Tablet by mouth two (2) times daily (with meals). 180 Tablet 1    methocarbamoL (ROBAXIN) 750 mg tablet Take 1 Tablet by mouth three (3) times daily. 90 Tablet 2    ergocalciferol (ERGOCALCIFEROL) 1,250 mcg (50,000 unit) capsule Take 1 Capsule by mouth every seven (7) days. 12 Capsule 3    SUMAtriptan (IMITREX) 100 mg tablet Take 1 Tablet by mouth daily as needed for Headache or Migraine.  9 Tablet 2    ferrous sulfate 325 mg (65 mg iron) tablet TAKE 1 TABLET BY MOUTH EVERY OTHER DAY ON MONDAY, WEDNESDAY, AND FRIDAY WITH ORANGE JUICE 90 Tablet 2     Allergies   Allergen Reactions    Onion Anaphylaxis    Raspberry Anaphylaxis    Caffeine Other (comments)     Reports \"bad shaking\"     Past Medical History:   Diagnosis Date    Anemia     Depression     Falls     Fatigue     Frequent headaches     Graves disease     Incontinence     Leg swelling     Memory disorder     Multiple sclerosis (HCC)     Muscle pain     Muscle weakness     Unintentional weight change      Past Surgical History:   Procedure Laterality Date    HX  SECTION x4 occurences    HX FREE SKIN GRAFT         Review of Systems   Constitutional: Negative for fever and malaise/fatigue. Eyes: Negative for blurred vision. Respiratory: Negative for cough and shortness of breath. Cardiovascular: Negative for chest pain and leg swelling. Neurological: Negative for dizziness, weakness and headaches. Objective:   Vital Signs: (As obtained by patient/caregiver at home)  There were no vitals taken for this visit. Physical Exam:  General appearance - alert, well  appearing, and in no acute distress. Mild right mandibular swelling noted. No redness appreciated. Mental status - A/O x 4,normal mood and affect. Eyes- no periorbital edema, drainage, or irritation noted. Nose- no obvious drainage or swelling. Throat- no obvious swelling, goiter, or notable lymphadenopathy  Chest - Symmetric chest rise. No wheezing or coughing. No distress. Skin- normal skin tone noted. No hyperpigmentation or obvious deformities. No diaphoresis noted. No flushing. Neuro - Normal speech, no focal findings or movement disorder. Other pertinent observable physical exam findings:-        We discussed the expected course, resolution and complications of the diagnosis(es) in detail. Medication risks, benefits, costs, interactions, and alternatives were discussed as indicated. I advised her to contact the office if her condition worsens, changes or fails to improve as anticipated. She expressed understanding with the diagnosis(es) and plan. [facility], was evaluated through a synchronous (real-time) audio-video encounter. The patient (or guardian if applicable) is aware that this is a billable service, which includes applicable co-pays. This Virtual Visit was conducted with patient's (and/or legal guardian's) consent.  The visit was conducted pursuant to the emergency declaration under the [address] waiver authority and the Coronavirus Preparedness and Response Supplemental Appropriations Act. Patient identification was verified, and a caregiver was present when appropriate. The patient was located at: Home: [address]  The provider was located at: Home: [place]   [place], [address] was used to authenticate this note.   -- Vaishali De La Cruz NP

## 2023-05-01 NOTE — PROGRESS NOTES
Chief Complaint   Patient presents with    Headache    Ankle Pain     Approx 4 weeks ago; injury; seems not getting better     1. Have you been to the ER, urgent care clinic since your last visit? Hospitalized since your last visit? No    2. Have you seen or consulted any other health care providers outside of the [address] [place] [address] since your last visit? Include any pap smears or colon screening.  No